# Patient Record
Sex: MALE | Race: BLACK OR AFRICAN AMERICAN | Employment: FULL TIME | URBAN - METROPOLITAN AREA
[De-identification: names, ages, dates, MRNs, and addresses within clinical notes are randomized per-mention and may not be internally consistent; named-entity substitution may affect disease eponyms.]

---

## 2021-05-14 ENCOUNTER — APPOINTMENT (OUTPATIENT)
Dept: GENERAL RADIOLOGY | Age: 31
DRG: 885 | End: 2021-05-14
Attending: PHYSICIAN ASSISTANT
Payer: COMMERCIAL

## 2021-05-14 ENCOUNTER — APPOINTMENT (OUTPATIENT)
Dept: CT IMAGING | Age: 31
DRG: 885 | End: 2021-05-14
Attending: PHYSICIAN ASSISTANT
Payer: COMMERCIAL

## 2021-05-14 ENCOUNTER — HOSPITAL ENCOUNTER (INPATIENT)
Age: 31
LOS: 6 days | Discharge: HOME | DRG: 885 | End: 2021-05-21
Attending: EMERGENCY MEDICINE | Admitting: PSYCHIATRY & NEUROLOGY
Payer: COMMERCIAL

## 2021-05-14 DIAGNOSIS — E87.6 HYPOKALEMIA: ICD-10-CM

## 2021-05-14 DIAGNOSIS — R51.9 NONINTRACTABLE EPISODIC HEADACHE, UNSPECIFIED HEADACHE TYPE: ICD-10-CM

## 2021-05-14 DIAGNOSIS — R79.89 ELEVATED LFTS: ICD-10-CM

## 2021-05-14 DIAGNOSIS — H60.322 ACUTE HEMORRHAGIC OTITIS EXTERNA OF LEFT EAR: ICD-10-CM

## 2021-05-14 DIAGNOSIS — F29 PSYCHOSIS, UNSPECIFIED PSYCHOSIS TYPE (HCC): Primary | ICD-10-CM

## 2021-05-14 DIAGNOSIS — F24: ICD-10-CM

## 2021-05-14 LAB
AMMONIA PLAS-SCNC: 23 UMOL/L (ref 11–32)
AMPHET UR QL SCN: NEGATIVE
APPEARANCE UR: CLEAR
BARBITURATES UR QL SCN: NEGATIVE
BENZODIAZ UR QL: NEGATIVE
BILIRUB UR QL: NEGATIVE
CANNABINOIDS UR QL SCN: POSITIVE
COCAINE UR QL SCN: NEGATIVE
COLOR UR: YELLOW
ETHANOL SERPL-MCNC: 5 MG/DL (ref 0–3)
GLUCOSE UR STRIP.AUTO-MCNC: NEGATIVE MG/DL
HDSCOM,HDSCOM: ABNORMAL
HGB UR QL STRIP: NEGATIVE
KETONES UR QL STRIP.AUTO: NEGATIVE MG/DL
LEUKOCYTE ESTERASE UR QL STRIP.AUTO: NEGATIVE
MAGNESIUM SERPL-MCNC: 2.3 MG/DL (ref 1.6–2.6)
METHADONE UR QL: NEGATIVE
NITRITE UR QL STRIP.AUTO: NEGATIVE
OPIATES UR QL: NEGATIVE
PCP UR QL: NEGATIVE
PH UR STRIP: 6.5 [PH] (ref 5–8)
PROT UR STRIP-MCNC: NEGATIVE MG/DL
SALICYLATES SERPL-MCNC: 2.6 MG/DL (ref 2.8–20)
SP GR UR REFRACTOMETRY: 1 (ref 1–1.03)
UROBILINOGEN UR QL STRIP.AUTO: 1 EU/DL (ref 0.2–1)

## 2021-05-14 PROCEDURE — 80143 DRUG ASSAY ACETAMINOPHEN: CPT

## 2021-05-14 PROCEDURE — 85025 COMPLETE CBC W/AUTO DIFF WBC: CPT

## 2021-05-14 PROCEDURE — 70450 CT HEAD/BRAIN W/O DYE: CPT

## 2021-05-14 PROCEDURE — 96372 THER/PROPH/DIAG INJ SC/IM: CPT

## 2021-05-14 PROCEDURE — 99285 EMERGENCY DEPT VISIT HI MDM: CPT

## 2021-05-14 PROCEDURE — 71045 X-RAY EXAM CHEST 1 VIEW: CPT

## 2021-05-14 PROCEDURE — 82077 ASSAY SPEC XCP UR&BREATH IA: CPT

## 2021-05-14 PROCEDURE — 80179 DRUG ASSAY SALICYLATE: CPT

## 2021-05-14 PROCEDURE — 74011000250 HC RX REV CODE- 250: Performed by: EMERGENCY MEDICINE

## 2021-05-14 PROCEDURE — 81003 URINALYSIS AUTO W/O SCOPE: CPT

## 2021-05-14 PROCEDURE — 83735 ASSAY OF MAGNESIUM: CPT

## 2021-05-14 PROCEDURE — 80053 COMPREHEN METABOLIC PANEL: CPT

## 2021-05-14 PROCEDURE — 82140 ASSAY OF AMMONIA: CPT

## 2021-05-14 PROCEDURE — 84443 ASSAY THYROID STIM HORMONE: CPT

## 2021-05-14 PROCEDURE — 80307 DRUG TEST PRSMV CHEM ANLYZR: CPT

## 2021-05-14 PROCEDURE — 93005 ELECTROCARDIOGRAM TRACING: CPT

## 2021-05-14 PROCEDURE — 72125 CT NECK SPINE W/O DYE: CPT

## 2021-05-14 PROCEDURE — 87635 SARS-COV-2 COVID-19 AMP PRB: CPT

## 2021-05-14 PROCEDURE — 74011250636 HC RX REV CODE- 250/636: Performed by: EMERGENCY MEDICINE

## 2021-05-14 PROCEDURE — 82553 CREATINE MB FRACTION: CPT

## 2021-05-14 RX ORDER — MAGNESIUM SULFATE HEPTAHYDRATE 500 MG/ML
2 INJECTION, SOLUTION INTRAMUSCULAR; INTRAVENOUS
Status: DISCONTINUED | OUTPATIENT
Start: 2021-05-14 | End: 2021-05-14 | Stop reason: SDUPTHER

## 2021-05-14 RX ORDER — MAGNESIUM SULFATE HEPTAHYDRATE 40 MG/ML
2 INJECTION, SOLUTION INTRAVENOUS ONCE
Status: COMPLETED | OUTPATIENT
Start: 2021-05-14 | End: 2021-05-15

## 2021-05-14 RX ORDER — POTASSIUM CHLORIDE 20 MEQ/1
40 TABLET, EXTENDED RELEASE ORAL
Status: COMPLETED | OUTPATIENT
Start: 2021-05-14 | End: 2021-05-15

## 2021-05-14 RX ADMIN — WATER 20 MG: 1 INJECTION INTRAMUSCULAR; INTRAVENOUS; SUBCUTANEOUS at 22:17

## 2021-05-15 PROBLEM — F29 PSYCHOSIS (HCC): Status: ACTIVE | Noted: 2021-05-15

## 2021-05-15 LAB
ALBUMIN SERPL-MCNC: 3.8 G/DL (ref 3.4–5)
ALBUMIN/GLOB SERPL: 1.1 {RATIO} (ref 0.8–1.7)
ALP SERPL-CCNC: 61 U/L (ref 45–117)
ALT SERPL-CCNC: 125 U/L (ref 16–61)
ANION GAP SERPL CALC-SCNC: 2 MMOL/L (ref 3–18)
ANION GAP SERPL CALC-SCNC: 4 MMOL/L (ref 3–18)
ANION GAP SERPL CALC-SCNC: 5 MMOL/L (ref 3–18)
APAP SERPL-MCNC: <2 UG/ML (ref 10–30)
AST SERPL-CCNC: 170 U/L (ref 10–38)
ATRIAL RATE: 77 BPM
BASOPHILS # BLD: 0 K/UL (ref 0–0.1)
BASOPHILS NFR BLD: 0 % (ref 0–2)
BILIRUB SERPL-MCNC: 0.8 MG/DL (ref 0.2–1)
BUN SERPL-MCNC: 11 MG/DL (ref 7–18)
BUN SERPL-MCNC: 9 MG/DL (ref 7–18)
BUN SERPL-MCNC: 9 MG/DL (ref 7–18)
BUN/CREAT SERPL: 10 (ref 12–20)
BUN/CREAT SERPL: 10 (ref 12–20)
BUN/CREAT SERPL: 12 (ref 12–20)
CALCIUM SERPL-MCNC: 7.8 MG/DL (ref 8.5–10.1)
CALCIUM SERPL-MCNC: 8.2 MG/DL (ref 8.5–10.1)
CALCIUM SERPL-MCNC: 8.3 MG/DL (ref 8.5–10.1)
CALCULATED P AXIS, ECG09: 19 DEGREES
CALCULATED R AXIS, ECG10: 54 DEGREES
CALCULATED T AXIS, ECG11: 41 DEGREES
CHLORIDE SERPL-SCNC: 105 MMOL/L (ref 100–111)
CHLORIDE SERPL-SCNC: 109 MMOL/L (ref 100–111)
CHLORIDE SERPL-SCNC: 109 MMOL/L (ref 100–111)
CK MB CFR SERPL CALC: 0.4 % (ref 0–4)
CK MB SERPL-MCNC: 22.4 NG/ML (ref 5–25)
CK SERPL-CCNC: 3549 U/L (ref 39–308)
CK SERPL-CCNC: 4528 U/L (ref 39–308)
CK SERPL-CCNC: 4571 U/L (ref 39–308)
CK SERPL-CCNC: 5494 U/L (ref 39–308)
CO2 SERPL-SCNC: 29 MMOL/L (ref 21–32)
CO2 SERPL-SCNC: 30 MMOL/L (ref 21–32)
CO2 SERPL-SCNC: 31 MMOL/L (ref 21–32)
COVID-19 RAPID TEST, COVR: NOT DETECTED
CREAT SERPL-MCNC: 0.86 MG/DL (ref 0.6–1.3)
CREAT SERPL-MCNC: 0.91 MG/DL (ref 0.6–1.3)
CREAT SERPL-MCNC: 0.95 MG/DL (ref 0.6–1.3)
DIAGNOSIS, 93000: NORMAL
DIFFERENTIAL METHOD BLD: ABNORMAL
EOSINOPHIL # BLD: 0 K/UL (ref 0–0.4)
EOSINOPHIL NFR BLD: 0 % (ref 0–5)
ERYTHROCYTE [DISTWIDTH] IN BLOOD BY AUTOMATED COUNT: 13 % (ref 11.6–14.5)
GLOBULIN SER CALC-MCNC: 3.4 G/DL (ref 2–4)
GLUCOSE SERPL-MCNC: 83 MG/DL (ref 74–99)
GLUCOSE SERPL-MCNC: 93 MG/DL (ref 74–99)
GLUCOSE SERPL-MCNC: 96 MG/DL (ref 74–99)
HCT VFR BLD AUTO: 41.1 % (ref 36–48)
HGB BLD-MCNC: 13.8 G/DL (ref 13–16)
LYMPHOCYTES # BLD: 2 K/UL (ref 0.9–3.6)
LYMPHOCYTES NFR BLD: 23 % (ref 21–52)
MCH RBC QN AUTO: 27.5 PG (ref 24–34)
MCHC RBC AUTO-ENTMCNC: 33.6 G/DL (ref 31–37)
MCV RBC AUTO: 82 FL (ref 74–97)
MONOCYTES # BLD: 0.4 K/UL (ref 0.05–1.2)
MONOCYTES NFR BLD: 4 % (ref 3–10)
NEUTS SEG # BLD: 6.4 K/UL (ref 1.8–8)
NEUTS SEG NFR BLD: 73 % (ref 40–73)
P-R INTERVAL, ECG05: 126 MS
PLATELET # BLD AUTO: 188 K/UL (ref 135–420)
PLATELET COMMENTS,PCOM: ABNORMAL
PMV BLD AUTO: 12.2 FL (ref 9.2–11.8)
POTASSIUM SERPL-SCNC: 3.3 MMOL/L (ref 3.5–5.5)
POTASSIUM SERPL-SCNC: 3.7 MMOL/L (ref 3.5–5.5)
POTASSIUM SERPL-SCNC: 3.9 MMOL/L (ref 3.5–5.5)
PROT SERPL-MCNC: 7.2 G/DL (ref 6.4–8.2)
Q-T INTERVAL, ECG07: 392 MS
QRS DURATION, ECG06: 98 MS
QTC CALCULATION (BEZET), ECG08: 443 MS
RBC # BLD AUTO: 5.01 M/UL (ref 4.35–5.65)
RBC MORPH BLD: ABNORMAL
SODIUM SERPL-SCNC: 139 MMOL/L (ref 136–145)
SODIUM SERPL-SCNC: 142 MMOL/L (ref 136–145)
SODIUM SERPL-SCNC: 143 MMOL/L (ref 136–145)
SOURCE, COVRS: NORMAL
TROPONIN I SERPL-MCNC: <0.02 NG/ML (ref 0–0.04)
TSH SERPL DL<=0.05 MIU/L-ACNC: 1.18 UIU/ML (ref 0.36–3.74)
VENTRICULAR RATE, ECG03: 77 BPM
WBC # BLD AUTO: 8.8 K/UL (ref 4.6–13.2)

## 2021-05-15 PROCEDURE — 74011250637 HC RX REV CODE- 250/637: Performed by: PSYCHIATRY & NEUROLOGY

## 2021-05-15 PROCEDURE — 74011250637 HC RX REV CODE- 250/637: Performed by: PHYSICIAN ASSISTANT

## 2021-05-15 PROCEDURE — 82550 ASSAY OF CK (CPK): CPT

## 2021-05-15 PROCEDURE — 74011250636 HC RX REV CODE- 250/636: Performed by: EMERGENCY MEDICINE

## 2021-05-15 PROCEDURE — 74011000250 HC RX REV CODE- 250: Performed by: PHYSICIAN ASSISTANT

## 2021-05-15 PROCEDURE — 74011250636 HC RX REV CODE- 250/636: Performed by: PHYSICIAN ASSISTANT

## 2021-05-15 PROCEDURE — 80048 BASIC METABOLIC PNL TOTAL CA: CPT

## 2021-05-15 PROCEDURE — 65220000003 HC RM SEMIPRIVATE PSYCH

## 2021-05-15 RX ORDER — AMOXICILLIN 250 MG/1
500 CAPSULE ORAL EVERY 8 HOURS
Status: DISCONTINUED | OUTPATIENT
Start: 2021-05-15 | End: 2021-05-16

## 2021-05-15 RX ORDER — HALOPERIDOL 5 MG/ML
5 INJECTION INTRAMUSCULAR
Status: DISCONTINUED | OUTPATIENT
Start: 2021-05-15 | End: 2021-05-21 | Stop reason: HOSPADM

## 2021-05-15 RX ORDER — HALOPERIDOL 5 MG/1
5 TABLET ORAL
Status: DISCONTINUED | OUTPATIENT
Start: 2021-05-15 | End: 2021-05-21 | Stop reason: HOSPADM

## 2021-05-15 RX ORDER — BENZTROPINE MESYLATE 1 MG/1
1 TABLET ORAL
Status: DISCONTINUED | OUTPATIENT
Start: 2021-05-15 | End: 2021-05-21 | Stop reason: HOSPADM

## 2021-05-15 RX ORDER — LORAZEPAM 2 MG/ML
1 INJECTION INTRAMUSCULAR
Status: DISCONTINUED | OUTPATIENT
Start: 2021-05-15 | End: 2021-05-21 | Stop reason: HOSPADM

## 2021-05-15 RX ORDER — LORAZEPAM 1 MG/1
1 TABLET ORAL
Status: DISCONTINUED | OUTPATIENT
Start: 2021-05-15 | End: 2021-05-21 | Stop reason: HOSPADM

## 2021-05-15 RX ORDER — LORAZEPAM 1 MG/1
2 TABLET ORAL ONCE
Status: DISPENSED | OUTPATIENT
Start: 2021-05-15 | End: 2021-05-16

## 2021-05-15 RX ORDER — TRAZODONE HYDROCHLORIDE 50 MG/1
50 TABLET ORAL
Status: DISCONTINUED | OUTPATIENT
Start: 2021-05-15 | End: 2021-05-21 | Stop reason: HOSPADM

## 2021-05-15 RX ORDER — BENZTROPINE MESYLATE 1 MG/ML
1 INJECTION INTRAMUSCULAR; INTRAVENOUS
Status: DISCONTINUED | OUTPATIENT
Start: 2021-05-15 | End: 2021-05-21 | Stop reason: HOSPADM

## 2021-05-15 RX ADMIN — HALOPERIDOL 5 MG: 5 TABLET ORAL at 23:47

## 2021-05-15 RX ADMIN — POTASSIUM CHLORIDE 40 MEQ: 1500 TABLET, EXTENDED RELEASE ORAL at 00:42

## 2021-05-15 RX ADMIN — BENZTROPINE MESYLATE 1 MG: 1 TABLET ORAL at 23:46

## 2021-05-15 RX ADMIN — WATER 20 MG: 1 INJECTION INTRAMUSCULAR; INTRAVENOUS; SUBCUTANEOUS at 01:04

## 2021-05-15 RX ADMIN — LORAZEPAM 1 MG: 1 TABLET ORAL at 23:46

## 2021-05-15 RX ADMIN — MAGNESIUM SULFATE HEPTAHYDRATE 2 G: 2 INJECTION, SOLUTION INTRAVENOUS at 00:42

## 2021-05-15 RX ADMIN — SODIUM CHLORIDE 1000 ML: 900 INJECTION, SOLUTION INTRAVENOUS at 02:31

## 2021-05-15 RX ADMIN — SODIUM CHLORIDE 1000 ML: 900 INJECTION, SOLUTION INTRAVENOUS at 01:17

## 2021-05-15 RX ADMIN — SODIUM CHLORIDE, SODIUM LACTATE, POTASSIUM CHLORIDE, AND CALCIUM CHLORIDE 1000 ML: 600; 310; 30; 20 INJECTION, SOLUTION INTRAVENOUS at 15:00

## 2021-05-15 NOTE — ED NOTES
Patient has repeatedly taken his cardiac monitoring leads, BP cuff and pulse ox off.      Provider has been notified

## 2021-05-15 NOTE — ED NOTES
[address] HANDOFF      Patient was turned over to me at the regular change of shift by Dr. Jay Riley, at 0800. Patient presented to the emergency department acutely psychotic. Requiring chemical sedation. The patient is awaiting crisis evaluation. He had an elevated CK which is downtrending with IV hydration. Plan for this patient is: Patient will be seen by crisis, medically cleared, anticipate he will require placement so will await their screening process for final disposition. Recent Results (from the past 12 hour(s))   CBC WITH AUTOMATED DIFF    Collection Time: 05/14/21 11:12 PM   Result Value Ref Range    WBC 8.8 4.6 - 13.2 K/uL    RBC 5.01 4.35 - 5.65 M/uL    HGB 13.8 13.0 - 16.0 g/dL    HCT 41.1 36.0 - 48.0 %    MCV 82.0 74.0 - 97.0 FL    MCH 27.5 24.0 - 34.0 PG    MCHC 33.6 31.0 - 37.0 g/dL    RDW 13.0 11.6 - 14.5 %    PLATELET 227 850 - 839 K/uL    MPV 12.2 (H) 9.2 - 11.8 FL    NEUTROPHILS 73 40 - 73 %    LYMPHOCYTES 23 21 - 52 %    MONOCYTES 4 3 - 10 %    EOSINOPHILS 0 0 - 5 %    BASOPHILS 0 0 - 2 %    ABS. NEUTROPHILS 6.4 1.8 - 8.0 K/UL    ABS. LYMPHOCYTES 2.0 0.9 - 3.6 K/UL    ABS. MONOCYTES 0.4 0.05 - 1.2 K/UL    ABS. EOSINOPHILS 0.0 0.0 - 0.4 K/UL    ABS.  BASOPHILS 0.0 0.0 - 0.1 K/UL    DF MANUAL      PLATELET COMMENTS ADEQUATE PLATELETS      RBC COMMENTS NORMOCYTIC, NORMOCHROMIC     CARDIAC PANEL,(CK, CKMB & TROPONIN)    Collection Time: 05/14/21 11:12 PM   Result Value Ref Range    CK - MB 22.4 (H) <3.6 ng/ml    CK-MB Index 0.4 0.0 - 4.0 %    CK 5,494 (H) 39 - 308 U/L    Troponin-I, QT <0.02 0.0 - 0.045 NG/ML   TSH 3RD GENERATION    Collection Time: 05/14/21 11:12 PM   Result Value Ref Range    TSH 1.18 0.36 - 3.74 uIU/mL   AMMONIA    Collection Time: 05/14/21 11:12 PM   Result Value Ref Range    Ammonia 23 11 - 32 UMOL/L   SALICYLATE    Collection Time: 05/14/21 11:12 PM   Result Value Ref Range    Salicylate level 2.6 (L) 2.8 - 33.5 MG/DL   METABOLIC PANEL, COMPREHENSIVE Collection Time: 05/14/21 11:12 PM   Result Value Ref Range    Sodium 139 136 - 145 mmol/L    Potassium 3.3 (L) 3.5 - 5.5 mmol/L    Chloride 105 100 - 111 mmol/L    CO2 29 21 - 32 mmol/L    Anion gap 5 3.0 - 18 mmol/L    Glucose 96 74 - 99 mg/dL    BUN 11 7.0 - 18 MG/DL    Creatinine 0.95 0.6 - 1.3 MG/DL    BUN/Creatinine ratio 12 12 - 20      GFR est AA >60 >60 ml/min/1.73m2    GFR est non-AA >60 >60 ml/min/1.73m2    Calcium 8.3 (L) 8.5 - 10.1 MG/DL    Bilirubin, total 0.8 0.2 - 1.0 MG/DL    ALT (SGPT) 125 (H) 16 - 61 U/L    AST (SGOT) 170 (H) 10 - 38 U/L    Alk.  phosphatase 61 45 - 117 U/L    Protein, total 7.2 6.4 - 8.2 g/dL    Albumin 3.8 3.4 - 5.0 g/dL    Globulin 3.4 2.0 - 4.0 g/dL    A-G Ratio 1.1 0.8 - 1.7     ACETAMINOPHEN    Collection Time: 05/14/21 11:12 PM   Result Value Ref Range    Acetaminophen level <2 (L) 10.0 - 30.0 ug/mL   ETHYL ALCOHOL    Collection Time: 05/14/21 11:12 PM   Result Value Ref Range    ALCOHOL(ETHYL),SERUM 5 (H) 0 - 3 MG/DL   MAGNESIUM    Collection Time: 05/14/21 11:12 PM   Result Value Ref Range    Magnesium 2.3 1.6 - 2.6 mg/dL   DRUG SCREEN, URINE    Collection Time: 05/14/21 11:20 PM   Result Value Ref Range    BENZODIAZEPINES Negative NEG      BARBITURATES Negative NEG      THC (TH-CANNABINOL) Positive (A) NEG      OPIATES Negative NEG      PCP(PHENCYCLIDINE) Negative NEG      COCAINE Negative NEG      AMPHETAMINES Negative NEG      METHADONE Negative NEG      HDSCOM (NOTE)    URINALYSIS W/ RFLX MICROSCOPIC    Collection Time: 05/14/21 11:20 PM   Result Value Ref Range    Color YELLOW      Appearance CLEAR      Specific gravity 1.005 1.005 - 1.030      pH (UA) 6.5 5.0 - 8.0      Protein Negative NEG mg/dL    Glucose Negative NEG mg/dL    Ketone Negative NEG mg/dL    Bilirubin Negative NEG      Blood Negative NEG      Urobilinogen 1.0 0.2 - 1.0 EU/dL    Nitrites Negative NEG      Leukocyte Esterase Negative NEG     EKG, 12 LEAD, INITIAL    Collection Time: 05/14/21 11:21 PM Result Value Ref Range    Ventricular Rate 77 BPM    Atrial Rate 77 BPM    P-R Interval 126 ms    QRS Duration 98 ms    Q-T Interval 392 ms    QTC Calculation (Bezet) 443 ms    Calculated P Axis 19 degrees    Calculated R Axis 54 degrees    Calculated T Axis 41 degrees    Diagnosis       Normal sinus rhythm  Normal ECG  No previous ECGs available     COVID-19 RAPID TEST    Collection Time: 05/14/21 11:24 PM   Result Value Ref Range    Specimen source Nasopharyngeal      COVID-19 rapid test Not detected NOTD     CK    Collection Time: 05/15/21  3:41 AM   Result Value Ref Range    CK 4,571 (H) 39 - 719 U/L   METABOLIC PANEL, BASIC    Collection Time: 05/15/21  3:41 AM   Result Value Ref Range    Sodium 142 136 - 145 mmol/L    Potassium 3.7 3.5 - 5.5 mmol/L    Chloride 109 100 - 111 mmol/L    CO2 31 21 - 32 mmol/L    Anion gap 2 (L) 3.0 - 18 mmol/L    Glucose 93 74 - 99 mg/dL    BUN 9 7.0 - 18 MG/DL    Creatinine 0.91 0.6 - 1.3 MG/DL    BUN/Creatinine ratio 10 (L) 12 - 20      GFR est AA >60 >60 ml/min/1.73m2    GFR est non-AA >60 >60 ml/min/1.73m2    Calcium 7.8 (L) 8.5 - 10.1 MG/DL       CT HEAD WO CONT   Final Result      1. No acute intracranial pathology. CT SPINE CERV WO CONT   Final Result      1. No acute fracture or subluxation. Thank you for this referral.      XR CHEST PORT   Final Result   No definite acute findings.           Medications   sodium chloride 0.9 % bolus infusion 1,000 mL (has no administration in time range)   ziprasidone (GEODON) 20 mg in sterile water (preservative free) 1 mL injection (20 mg IntraMUSCular Given 5/14/21 2217)   magnesium sulfate 2 g/50 ml IVPB (premix or compounded) (0 g IntraVENous IV Completed 5/15/21 0231)   potassium chloride (K-DUR, KLOR-CON) SR tablet 40 mEq (40 mEq Oral Given 5/15/21 0042)   sodium chloride 0.9 % bolus infusion 1,000 mL (0 mL IntraVENous IV Completed 5/15/21 0231)   sodium chloride 0.9 % bolus infusion 1,000 mL (0 mL IntraVENous IV Completed 5/15/21 1974)   ziprasidone (GEODON) 20 mg in sterile water (preservative free) 1 mL injection (20 mg IntraMUSCular Given 5/15/21 0104)         Course:   ED Course as of May 15 1741   Sat May 15, 2021   0908 Crisis in to see the patient. [ROLAND]   2226 Patient seen by the crisis screener. Patient is no longer voluntary, will need to be seen by the [facility] for a TDO. Patient insistent that his wife stay with him however that would not be possible on the psych floor. [ROLAND]   1247 Crisis screen requested repeat CK. We will repeat it now and if it is still significantly elevated will order some more fluids before rechecking it. [ROLAND]      ED Course User Index  [ROLAND] Noah Chery DO       Diagnosis:   1. Psychosis, unspecified psychosis type ([facility] [address]    2. Hypokalemia    3. Elevated LFTs    4. Nonintractable episodic headache, unspecified headache type      Will repeat the creatine kinase and give additional IV fluids. Care will be turned over to Dr. Latasha Chavez at the usual change of shift pending repeat CK and placement.     Disposition: TBD    Follow-up Information    None         Patient's Medications    No medications on file

## 2021-05-15 NOTE — ED NOTES
Bedside and verbal shift report given by Jaxson Atkins (off going nurse) to Lacy Phillip RN (oncoming nurse). Report included the following information SBAR and ED Summary.

## 2021-05-15 NOTE — ED NOTES
Patient pacing around in [place], requesting information about mental illness, called upstairs and they will bring down some information

## 2021-05-15 NOTE — ED TRIAGE NOTES
[facility] of [facility] pain and mental health evaluation. During transport to ED, patient \"looked EMS in the eyes and said he saw the devil in them. \" He also tried to jump out of the ambulance during transport. The medics had to pull over and called the police. Patient was somewhat combative with police and ended up hitting his head on the pavement. Upon arrival to the ED patient was combative, agitated and non coroperative.

## 2021-05-15 NOTE — BSMART NOTE
Crisis Note: Patient has requested to leave with MD on call notified and patient to be seen by [facility] for evaluation, Spoke with Vinayak Mack, with paper work faxed and pending evaluation, she will advise writer when completed,

## 2021-05-15 NOTE — BSMART NOTE
Comprehensive Assessment Form Part 1 Section I - Disposition The plan is pending admission to behavioral health unit, CK level is elevated with on call MD requesting continued monitoring with CK level observed trending downward to at least 3000, prior to admit to behavioral health unit. Dr. Damon Briseno has been advised of on call MD request prior to patient arriving on unit, Patient stated he is receptive to [facility] behavioral health unit, discussed with Dr. Damon Briseno. The on-call Psychiatrist consulted was Dr. Herlinda Bowens, The admitting Diagnosis is Psychosis, Section II - Integrated Summary Summary: Writer was requested to provide crisis evaluation for patient, patient stated he was admitted to hospital for \"I had some mixed messages, would ask for help then not ask for help\", he stated \"I'm healing now\". Patient stated he was initially treated at the [facility] because he \"needed to get this stuff right, the messages I am getting are not what I'm supposed to do\",  He stated after 1 day his wife signed him out AMA, he denies SI/HI, he denies visual hallucinations and appears to be responding to internal stimuli, he stated he was \"not sure if I'm hearing things, this is kind of complicated, because I do then\". wife then gestured to patient and he stated \"oh no I only hear you\". He stated he left the [place] because he felt as though he was not receiving the correct message \"they're trying to read me, they were sending odd vibes, the messages they were sending me were not right\". The patient is deemed competent to provide informed consent. The information is given by the patient. The Chief Complaint is Psychosis. The Precipitant Factors are hallucinations and delusions. [facility] The patient has not previously been in restraints. Current therapist: CASSANDRA, Shaun Nix @ [facility].  
 
        Lethality Assessment: The potential for suicide is noted by the following: not noted. The potential for homicide is not noted. The patient has not been a perpetrator of sexual or physical abuse. There are not pending charges. Section III - Psychosocial 
The patient's overall mood and attitude is elevated and anxious. Feelings of helplessness and hopelessness are not observed. Generalized anxiety is observed by rapid speech, pacing, inability to sit while talking and wringing hands and rubbing. Panic is not observed. Phobias are not observed. Obsessive compulsive tendencies are not observed. Section IV - Mental Status Exam 
The patient's appearance shows poor hygiene hair is unkempt. The patient's behavior is manic , shows poor impulse control, is restless and elevated mood,  The patient is oriented to time, place, person and situation. The patient's speech is rapid and pressured. The patient's mood  is anxious and is expansive. The range of affect is labile and is inappropriate. The patient's thought content  demonstrates delusions, ideas of reference, paranoia. The thought process shows a flight of ideas. The patient's perception shows no evidence of impairment. The patient's memory is impaired he was unable to recall past events or dates of occurrence. The patient's appetite shows no evidence of impairment. The patient's sleep has evidence of insomnia, with patient reporting it may be related to his new work schedule with little to no sleep at night. The patient shows fair insight. The patient's judgement is fair. Section V - Substance Abuse The patient is using substances. The patient is using cannabis by inhalation for 1-5 years with last use on unknown \"Ah, I don't know\". Section VI - Living Arrangements The patient is . The patient lives with a spouse. The patient has no children in home. The patient does plan to return home upon discharge. The patient does not have legal issues pending. The patient's source of income comes from employment. 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 The patient's greatest support comes from his wife and this person will be involved with the treatment. The patient has not been in an event described as horrible or outside the realm of ordinary life experience either currently or in the past. 
The patient has not been a victim of sexual/physical abuse. Section VII - Other Areas of Clinical Concern The highest grade achieved is first year of college with multiple certifications,  with the overall quality of school experience being described as good. The patient is currently  employed and speaks [place] as a primary language. The patient has no communication impairments affecting communication. The patient's preference for learning can be described as: can read and write adequately. The patient's hearing is normal.  The patient's vision is normal . Anisha Alvraado, PARVIZ, MSN

## 2021-05-15 NOTE — ED NOTES
Crisis here to see patient, patient talking with her, patient requesting to go home, crisis will talk to MD but feels that patient might need to become a TDO

## 2021-05-15 NOTE — ED NOTES
Assume care of patient, patient alert and oriented x 4, patient reading bible and family at bedside, patient waiting for placement upstairs, patient is not suicidal or homicidal,

## 2021-05-15 NOTE — ED NOTES
Purposeful rounding completed:     Side rails up x 1:  YES  Bed in low position and wheels locked: YES  Call bell within reach: YES  Comfort addressed: YES    Toileting needs addressed: YES  Plan of care reviewed/updated with patient and or family members: YES  IV site assessed: YES  Pain assessed and addressed: YES, 0

## 2021-05-15 NOTE — PROGRESS NOTES
conducted an initial consultation and Spiritual Assessment for Shane Jacinto, who is a 33 y.o.,male. Patient's Primary Language is: [place]. According to the patient's EMR Anglican Affiliation is: Non Episcopal.       The  provided the following Interventions:  Initiated a relationship of care and support. Provided Bible as requested. Explored spiritual needs needs while hospitalized. Listened empathically as Chaim Valle and his wife shared thoughts and feelings about God and God's plan for them. Encouraged Chaim Molinar to request a  again if desired. Prayed with them and offered assurance of continued prayers. .   Chart reviewed. The following outcomes where achieved:  Patient shared limited information about both their medical narrative and spiritual journey/beliefs. Patient processed feelings about current hospitalization and his carley. Patient expressed gratitude for 's visit. Assessment:  Patient did not express any Episcopal/cultural needs that will affect patient's preferences in health care. There are no spiritual or Episcopal issues which require intervention at this time. Plan:  Chaplains will continue to follow and will provide pastoral care on an as needed/requested basis.  recommends bedside caregivers page  on duty if patient shows signs of acute spiritual or emotional distress.     [address]   [phone]

## 2021-05-15 NOTE — ED PROVIDER NOTES
EMERGENCY DEPARTMENT HISTORY AND PHYSICAL EXAM    11:13 PM      Date: 5/14/2021  Patient Name: Frederic West    History of Presenting Illness     Chief Complaint   Patient presents with    Chest Pain    Mental Health Problem         History Provided By: Patient, EMS, Wife    Additional History (Context): Frederic West is a 33 y.o. male with no known PMH who presents to the ED due to central chest pain and headache x unknown time. Patient notes he was at [facility] and then called an ambulance. Pt will not provide further history    Per EMS, patient became agitated, spoke of spirits, and tried to jump out of the medic. Pt denies SI, HI, drug or alcohol use. Per wife, patient was recently admitted to the [place] [facility] and \"I signed him out 2 days ago \". Notes Gilma Skinner thinks they gave him drugs that are making him this way \". Denies daily medication, hx of psychiatric illness. PCP: None    Current Facility-Administered Medications   Medication Dose Route Frequency Provider Last Rate Last Admin    sodium chloride 0.9 % bolus infusion 1,000 mL  1,000 mL IntraVENous Deitra Sammy Peng, [address] 1,000 mL/hr at 05/15/21 0117 1,000 mL at 05/15/21 0117    sodium chloride 0.9 % bolus infusion 1,000 mL  1,000 mL IntraVENous Deitra Sammy Peng, [address]        sodium chloride 0.9 % bolus infusion 1,000 mL  1,000 mL IntraVENous Veena Causey, [address]           Past History     Past Medical History:  No past medical history on file. Past Surgical History:  No past surgical history on file. Family History:  No family history on file. Social History:  Social History     Tobacco Use    Smoking status: Not on file   Substance Use Topics    Alcohol use: Not on file    Drug use: Not on file       Allergies:  No Known Allergies      Review of Systems       Review of Systems   Constitutional: Negative for chills and fever. Respiratory: Negative for shortness of breath.     Cardiovascular: Positive for chest pain. Gastrointestinal: Negative for abdominal pain, nausea and vomiting. Skin: Negative for rash. Neurological: Positive for headaches. Negative for weakness. Psychiatric/Behavioral: Positive for agitation, behavioral problems and hallucinations. The patient is nervous/anxious and is hyperactive. All other systems reviewed and are negative. Physical Exam     Visit Vitals  /66   Pulse 83   Temp 97.3 °F (36.3 °C)   Resp 16   Wt 99.8 kg (220 lb)   SpO2 94%         Physical Exam  Vitals signs and nursing note reviewed. Constitutional:       General: He is not in acute distress. Appearance: He is well-developed. He is not ill-appearing, toxic-appearing or diaphoretic. HENT:      Head: Normocephalic and atraumatic. Neck:      Musculoskeletal: Normal range of motion and neck supple. Cardiovascular:      Rate and Rhythm: Normal rate and regular rhythm. Heart sounds: Normal heart sounds. No murmur. No friction rub. No gallop. Pulmonary:      Effort: Pulmonary effort is normal. No respiratory distress. Breath sounds: Normal breath sounds. No wheezing or rales. Musculoskeletal: Normal range of motion. Skin:     General: Skin is warm. Findings: No rash. Neurological:      Mental Status: He is alert and oriented to person, place, and time. Cranial Nerves: Cranial nerves are intact. Gait: Gait is intact. Psychiatric:         Attention and Perception: He is inattentive. He perceives visual hallucinations. Mood and Affect: Affect is labile and inappropriate. Speech: Speech is rapid and pressured. Behavior: Behavior is agitated, aggressive, hyperactive and combative. Thought Content: Thought content does not include homicidal or suicidal ideation.       Comments: Pt running around ED, took off his shirt, notes there are spirits passing through him           Diagnostic Study Results     Labs -  Recent Results (from the past 12 hour(s))   CBC WITH AUTOMATED DIFF    Collection Time: 05/14/21 11:12 PM   Result Value Ref Range    WBC 8.8 4.6 - 13.2 K/uL    RBC 5.01 4.35 - 5.65 M/uL    HGB 13.8 13.0 - 16.0 g/dL    HCT 41.1 36.0 - 48.0 %    MCV 82.0 74.0 - 97.0 FL    MCH 27.5 24.0 - 34.0 PG    MCHC 33.6 31.0 - 37.0 g/dL    RDW 13.0 11.6 - 14.5 %    PLATELET 818 429 - 963 K/uL    MPV 12.2 (H) 9.2 - 11.8 FL    NEUTROPHILS 73 40 - 73 %    LYMPHOCYTES 23 21 - 52 %    MONOCYTES 4 3 - 10 %    EOSINOPHILS 0 0 - 5 %    BASOPHILS 0 0 - 2 %    ABS. NEUTROPHILS 6.4 1.8 - 8.0 K/UL    ABS. LYMPHOCYTES 2.0 0.9 - 3.6 K/UL    ABS. MONOCYTES 0.4 0.05 - 1.2 K/UL    ABS. EOSINOPHILS 0.0 0.0 - 0.4 K/UL    ABS.  BASOPHILS 0.0 0.0 - 0.1 K/UL    DF MANUAL      PLATELET COMMENTS ADEQUATE PLATELETS      RBC COMMENTS NORMOCYTIC, NORMOCHROMIC     CARDIAC PANEL,(CK, CKMB & TROPONIN)    Collection Time: 05/14/21 11:12 PM   Result Value Ref Range    CK - MB 22.4 (H) <3.6 ng/ml    CK-MB Index 0.4 0.0 - 4.0 %    CK 5,494 (H) 39 - 308 U/L    Troponin-I, QT <0.02 0.0 - 0.045 NG/ML   TSH 3RD GENERATION    Collection Time: 05/14/21 11:12 PM   Result Value Ref Range    TSH 1.18 0.36 - 3.74 uIU/mL   AMMONIA    Collection Time: 05/14/21 11:12 PM   Result Value Ref Range    Ammonia 23 11 - 32 UMOL/L   SALICYLATE    Collection Time: 05/14/21 11:12 PM   Result Value Ref Range    Salicylate level 2.6 (L) 2.8 - 95.9 MG/DL   METABOLIC PANEL, COMPREHENSIVE    Collection Time: 05/14/21 11:12 PM   Result Value Ref Range    Sodium 139 136 - 145 mmol/L    Potassium 3.3 (L) 3.5 - 5.5 mmol/L    Chloride 105 100 - 111 mmol/L    CO2 29 21 - 32 mmol/L    Anion gap 5 3.0 - 18 mmol/L    Glucose 96 74 - 99 mg/dL    BUN 11 7.0 - 18 MG/DL    Creatinine 0.95 0.6 - 1.3 MG/DL    BUN/Creatinine ratio 12 12 - 20      GFR est AA >60 >60 ml/min/1.73m2    GFR est non-AA >60 >60 ml/min/1.73m2    Calcium 8.3 (L) 8.5 - 10.1 MG/DL    Bilirubin, total 0.8 0.2 - 1.0 MG/DL    ALT (SGPT) 125 (H) 16 - 61 U/L    AST (SGOT) 170 (H) 10 - 38 U/L    Alk.  phosphatase 61 45 - 117 U/L    Protein, total 7.2 6.4 - 8.2 g/dL    Albumin 3.8 3.4 - 5.0 g/dL    Globulin 3.4 2.0 - 4.0 g/dL    A-G Ratio 1.1 0.8 - 1.7     ACETAMINOPHEN    Collection Time: 05/14/21 11:12 PM   Result Value Ref Range    Acetaminophen level <2 (L) 10.0 - 30.0 ug/mL   ETHYL ALCOHOL    Collection Time: 05/14/21 11:12 PM   Result Value Ref Range    ALCOHOL(ETHYL),SERUM 5 (H) 0 - 3 MG/DL   MAGNESIUM    Collection Time: 05/14/21 11:12 PM   Result Value Ref Range    Magnesium 2.3 1.6 - 2.6 mg/dL   DRUG SCREEN, URINE    Collection Time: 05/14/21 11:20 PM   Result Value Ref Range    BENZODIAZEPINES Negative NEG      BARBITURATES Negative NEG      THC (TH-CANNABINOL) Positive (A) NEG      OPIATES Negative NEG      PCP(PHENCYCLIDINE) Negative NEG      COCAINE Negative NEG      AMPHETAMINES Negative NEG      METHADONE Negative NEG      HDSCOM (NOTE)    URINALYSIS W/ RFLX MICROSCOPIC    Collection Time: 05/14/21 11:20 PM   Result Value Ref Range    Color YELLOW      Appearance CLEAR      Specific gravity 1.005 1.005 - 1.030      pH (UA) 6.5 5.0 - 8.0      Protein Negative NEG mg/dL    Glucose Negative NEG mg/dL    Ketone Negative NEG mg/dL    Bilirubin Negative NEG      Blood Negative NEG      Urobilinogen 1.0 0.2 - 1.0 EU/dL    Nitrites Negative NEG      Leukocyte Esterase Negative NEG     EKG, 12 LEAD, INITIAL    Collection Time: 05/14/21 11:21 PM   Result Value Ref Range    Ventricular Rate 77 BPM    Atrial Rate 77 BPM    P-R Interval 126 ms    QRS Duration 98 ms    Q-T Interval 392 ms    QTC Calculation (Bezet) 443 ms    Calculated P Axis 19 degrees    Calculated R Axis 54 degrees    Calculated T Axis 41 degrees    Diagnosis       Normal sinus rhythm  Normal ECG  No previous ECGs available     COVID-19 RAPID TEST    Collection Time: 05/14/21 11:24 PM   Result Value Ref Range    Specimen source Nasopharyngeal      COVID-19 rapid test Not detected NOTD         Radiologic Studies -   CT HEAD WO CONT   Final Result      1. No acute intracranial pathology. CT SPINE CERV WO CONT   Final Result      1. No acute fracture or subluxation. Thank you for this referral.      XR CHEST PORT    (Results Pending)         Medical Decision Making   I am the first provider for this patient. I reviewed the vital signs, available nursing notes, past medical history, past surgical history, family history and social history. Vital Signs-Reviewed the patient's vital signs. EKG: Interpreted by the EP. Time Interpreted: 2325   Rate: 77   Rhythm: normal sinus rhythm     Records Reviewed: Nursing Notes and Old Medical Records (Time of Review: 11:13 PM)    ED Course: Progress Notes, Reevaluation, and Consults:  12:25 AM: Pt CUBA to CT   1:00 PM: Pt resting more comfortably. PROGRESS NOTE:  1:51 AM   Patient care will be transferred to Dr. Ro Murdock. Discussed available diagnostic results and care plan at length. Pending repeat BMP, CK, crisis consultation, possible ECO. Written by Theorin Kyle PA-C      Diagnosis     Clinical Impression:   1. Psychosis, unspecified psychosis type ([address]    2. Hypokalemia    3. Elevated LFTs    4. Nonintractable episodic headache, unspecified headache type        Disposition: TBD     Follow-up Information    None          Patient's Medications    No medications on file       Dictation disclaimer:  Please note that this dictation was completed with [email or web address], the computer voice recognition software. Quite often unanticipated grammatical, syntax, homophones, and other interpretive errors are inadvertently transcribed by the computer software. Please disregard these errors. Please excuse any errors that have escaped final proofreading.

## 2021-05-15 NOTE — ED NOTES
Patient pulled out IV access. Patient has been pacing around the room, talking about feeling like he is \"going backwards, mentally. \" He also states he \"needs to be grounded\"     Patient refusing vitals.  Dr. Nino Vaughan notified

## 2021-05-15 NOTE — ED NOTES
Purposeful rounding completed:    Side rails up x 0:  YES  Bed in low position and wheels locked: YES  Call bell within reach: YES  Comfort addressed: YES    Toileting needs addressed: YES  Plan of care reviewed/updated with patient and or family members: YES  IV site assessed: YES  Pain assessed and addressed: YES, 0

## 2021-05-16 PROCEDURE — 74011250637 HC RX REV CODE- 250/637: Performed by: PSYCHIATRY & NEUROLOGY

## 2021-05-16 PROCEDURE — 74011250637 HC RX REV CODE- 250/637: Performed by: EMERGENCY MEDICINE

## 2021-05-16 PROCEDURE — 65220000003 HC RM SEMIPRIVATE PSYCH

## 2021-05-16 PROCEDURE — 99222 1ST HOSP IP/OBS MODERATE 55: CPT | Performed by: PSYCHIATRY & NEUROLOGY

## 2021-05-16 RX ORDER — AMOXICILLIN 250 MG/1
500 CAPSULE ORAL EVERY 8 HOURS
Status: DISCONTINUED | OUTPATIENT
Start: 2021-05-16 | End: 2021-05-16

## 2021-05-16 RX ORDER — AMOXICILLIN 250 MG/1
500 CAPSULE ORAL EVERY 8 HOURS
Status: DISCONTINUED | OUTPATIENT
Start: 2021-05-16 | End: 2021-05-21 | Stop reason: HOSPADM

## 2021-05-16 RX ADMIN — HALOPERIDOL 5 MG: 5 TABLET ORAL at 18:55

## 2021-05-16 RX ADMIN — AMOXICILLIN 500 MG: 250 CAPSULE ORAL at 08:12

## 2021-05-16 RX ADMIN — BENZTROPINE MESYLATE 1 MG: 1 TABLET ORAL at 18:55

## 2021-05-16 RX ADMIN — LORAZEPAM 1 MG: 1 TABLET ORAL at 13:27

## 2021-05-16 RX ADMIN — AMOXICILLIN 500 MG: 250 CAPSULE ORAL at 23:18

## 2021-05-16 RX ADMIN — LORAZEPAM 1 MG: 1 TABLET ORAL at 18:55

## 2021-05-16 RX ADMIN — HALOPERIDOL 5 MG: 5 TABLET ORAL at 13:27

## 2021-05-16 RX ADMIN — CIPROFLOXACIN HYDROCHLORIDE AND HYDROCORTISONE 3 DROP: 2; 10 SUSPENSION AURICULAR (OTIC) at 18:38

## 2021-05-16 RX ADMIN — CIPROFLOXACIN HYDROCHLORIDE AND HYDROCORTISONE 3 DROP: 2; 10 SUSPENSION AURICULAR (OTIC) at 08:12

## 2021-05-16 RX ADMIN — BENZTROPINE MESYLATE 1 MG: 1 TABLET ORAL at 13:27

## 2021-05-16 RX ADMIN — AMOXICILLIN 500 MG: 250 CAPSULE ORAL at 16:04

## 2021-05-16 NOTE — BH NOTES
Patient admitted is a 31yr old male escorted to the unit via ER staff and security via transport chair. Patient refused to sign admission paper. The patient is being admitted due to patient trying to jump out of the ambulance, paranoid, and the patient signed out AMA from the [facility] 2 days ago. During nursing assessment the patient  is guarded,parnoid, talking about the devil. The supervisor came up and and explained to the patient the importance of taking medications. The patient Patient given Ativan for anxiety, Haldol for psychosis and Cogentin for extrapyramidal effects will continue to follow current POC and interventions per policies/protocols. RNs will initiate, develop, implement, review or revise treatment plan.

## 2021-05-16 NOTE — BH NOTES
Group Therapy Note    Patient: Chase Long    Patient # in Group: [id number]    Group Type: Leisure-Creative Group    Group Time: 30 minutes    Method used: Free discussion    Attendance: Chase Long was      compliant with group and participated fully for 100% of the duration. Interaction Narrative: Chase Long had a Stable mood, was Appropriate during group and Christoper Riedel Garedo's thought content was Goal Directed. Writer Reinforced patient's understanding of how to use recreational therapies to enhance one's own ability to effectively process significant stressors that stem from life and issues related to diagnosis symptoms. Patient was Able to listen to others, Able to reflect/comment on own behavior and Able to experience relief/decrease in symptoms. Will continue to monitor and provide redirection, education, and support as needed.

## 2021-05-16 NOTE — ED NOTES
TRANSFER - OUT REPORT:    Verbal report given to [place], RN(name) on Lynn Speaker  being transferred to Adult unit(unit) for routine progression of care       Report consisted of patients Situation, Background, Assessment and   Recommendations(SBAR). Information from the following report(s) SBAR, MAR and Recent Results was reviewed with the receiving nurse. Lines:   Peripheral IV 64/64/85 Left Basilic (Active)   Site Assessment Clean, dry, & intact 05/15/21 1409   Phlebitis Assessment 0 05/15/21 1409   Infiltration Assessment 0 05/15/21 1409   Dressing Status Clean, dry, & intact 05/15/21 1409   Dressing Type Tape;Transparent 05/15/21 1409   Hub Color/Line Status Pink;Flushed;Patent 05/15/21 1409        Opportunity for questions and clarification was provided.       Patient transported with:   [employer]

## 2021-05-16 NOTE — BH NOTES
Progress Note   Patient approached writer with intent to discharge from the unit, citing that he felt as thought he could heal better at home. Writer called Dr. Etelvina Calix and was directed to have patient screened by [facility] for possible TDO. Writer then called Crisis supervisor to alert her as to the need for the screening by the [facility]. Shortly after this, Gianni Wills was alerted by the Crisis supervisor that the patient had already been prescreened by the [facility] while in the ED and was found not detainable previously. After learning this information, writer contacted Dr. Etelvina Calix again and received direction for ok to discharge patient via [place] at this time. Patient was informed of previous and was also redirected/educated by Crisis supervisor on AMA and the need for medication and for follow up services that will not be available until tomorrow at the earliest. Patient then recanted his want/need to [place] discharge and asked for medications to \"keep him straight. \" Patient received haldol 5mg, cogentin 1mg, and ativan 1mg and took without further incident. Will continue to monitor for effectiveness and provide support as needed.

## 2021-05-16 NOTE — ED NOTES
Patient offered ativan, pt declined then ask for medication. Provided pt with medication he then says, \"I changed my mind I don't want it im done playing games. \"

## 2021-05-16 NOTE — H&P
[address] [facility] HISTORY AND PHYSICAL    Name:  Concepcion Gonzales  MR#:   [id number]  :  1990  ACCOUNT #:  [de-identified]  ADMIT DATE:  2021    Admission date to the emergency room was 2021, admission to the [facility] was 05/15/2021. IDENTIFYING INFORMATION:  The patient is a 75-year-old male, , admitted to this facility voluntarily on the above-mentioned date. BASIS FOR ADMISSION:  The patient was brought to the attention of DR. DELONG'S [facility]"" ER via ambulance after he apparently had been evaluated at one of the patient's first emergency rooms in area whom apparently were the ones who had called the ambulance referring the patient to inpatient, took for evaluation here at the ER. The patient initially did not provide any further history when he was examined by the PA that was during the admission to the ER; however, eventually he did describe having a sensation of increased \"pressure\" in his chest and was described as becoming agitated speaking of spirits and had been described as trying to jump out of the ambulance that had brought him in. The patient initially had denied any suicidal thoughts or homicidal thoughts, and had denied the use of drug or alcohol; however, upon admission to the emergency room, the urine drug screen came back positive for cannabis. It is of interest to note that in addition to the patient himself having some psychotic symptoms, questions were raised about his wife also having psychotic symptoms herself. She did mention when she was interviewed that she had \"signed him out after 2 days of being in the [facility],\" reasons for which the patient had demanded discharge from the [facility] not being cleared.   By review of the electronic medical records, we do not have any access to the [place] records, and so the reasons for his admission there and his progress while hospitalized at the [facility] are not known to us.  Nonetheless, the patient himself was found to be rather psychotic, questions raised about his being capable to sign himself involuntarily. The TDO evaluation with a crisis staff with local community services board was requested with the patient being found then not to be detainable apparently because he was willing to sign it. PSYCHIATRIC HISTORY:  The patient denies any previous psychiatric history. When the undersigned met with him and asked him information as to the reasons for which he had consulted with the patient first and later on being referred to us, he answered Chey Saba is internalizing due to neglect. \"  He did not want to elaborate anything more about it; however, as the interview progressed we did mention that the information that we had obtained through the emergency room was indicative of his reality testing being impaired since he was describing the presence of spirits on his chest.  It is not clear either as to why is that, that he was discharged from the [facility]; however, it appears that he was also very suspicious while he was in that facility. This is the first time in which the patient exhibits any symptoms like he was being described having; however, he did describe chronic use of cannabis. He was not able or willing to describe the cannabis that he has been using as being different to the regular one that he has been using in the past and actually did not want to talk very much about the symptoms that he was having; however, while at the emergency room, the patient was offered lorazepam 2 mg to calm down. Not clear if he did take the prescription or not; however, it is to be mentioned that since admission to the floor, he required a combination of haloperidol and Cogentin, the same as lorazepam since he was very agitated or psychotic and since then there had been evidence of improvement of his psychotic symptoms. This is by the way by a staff's description.     MEDICAL HISTORY: While in the emergency room, the patient required administration of at least 3000 mL of saline solution. His past medical history was apparently described as not being on file, the same as his surgical history. ALLERGIES:  THE PATIENT DESCRIBED NOT HAVING ANY TYPE OF ALLERGIES TO MEDICATIONS AND/OR FOOD. REVIEW OF SYSTEMS:  Psychiatric:  Remarkable for agitation, behavioral problems, hallucinations, and described as hyperactive. Neurologic:  Positive for headaches, negative for weaknesses. Cardiovascular:  Remarkable for the history of chest pain. Rest of the review of systems described as negative. PHYSICAL EXAMINATION:  VITAL SIGNS:  Blood pressure in the emergency room 117/66 with a heart rate of 33. The patient was afebrile with a temperature of 97.3 and respirations 16. Bilateral ear infection. Antibiotic therapy initiated. Otherwise be referred to the physical exam performed since the pt's admission to the behavioral unit. LABORATORY DATA:  Multiple labs were performed at the time of the patient's evaluation in the emergency department including a CBC with differential that showed completely normal test results. The urinalysis was found to be negative. Blood chemistry showed a sodium of 139, potassium 3.3, chloride of 105, blood sugar 96, BUN 11, creatinine 0.95, estimated GFR above 60 mL/minute. Liver function tests showed the presence of elevated ALT to 125 and AST to 170. Alkaline phosphatase normal at 61. CK was noted to be elevated to 5500 units per liter with normal troponin. Ammonia levels 23. Acetaminophen and salicylate levels below range. Alcohol level 5 mg/dL. TSH normal at 1.1. Urine drug screen positive only for cannabis. SARS-CoV-19 showed negative result from a nasopharyngeal study. Repeat of the patient's BMP x2 on 05/15 showed normal electrolytes with normal kidney functioning tests and CK trending downward.   On 05/15 at 4:30 p.m., results were as low as 3549 units per liter. Other tests performed in the emergency room including an electrocardiogram, which showed normal results with a normal sinus rhythm, ventricular rate response of 77 beats per minute,  and QTc 443. CT scan of the head and cervical spine showed no evidence of pathology on either one of them. ALCOHOL AND DRUG HISTORY:  The patient denies the use of alcohol. He denies the use of any other illicit drugs and cannabis. He described his use of cannabis as long lasting. Did not want to be very specific; however, he did mention that his cannabis use had gotten to the point in which he was smoking 2-3 times a day at times. He indicated that he is aware that, that is problematic for him and he was planning on being able to do whatever he has to do to be able to stop it. Otherwise, the patient denies abusing over-the-counter medications or prescription medications. PERSONAL HISTORY AND FAMILY HISTORY:  The patient has been  for 10 years. His parents  when he was apparently a child. Both his mother and father had remarried someone else. The patient described having 2 full siblings, a brother and a sister, and 1 half-sister. He was in [employer] for 5 years. 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 The patient has been  to his wife for 10 years. Apparently, they went to the same grade school and high school; however, did not be intimate until later on. The patient and his wife have no children. Talking about not having children was obviously painful to the patient who began to cry when indicating that they did not have any. He will love to have children, he says. Not clear if his wife is not in agreement. Otherwise, the patient's wife appears to be very supportive of him.   When the patient was taken to the [facility], she drove in with him. It is of interest to note that the patient and his wife's family were going to put a missing person request through the local police. Not clear if the patient since then has been able to call his and her family. It is to be mentioned that the patient's wife is also in the emergency department. It is my understanding that there is evidence that she is also having problems with reality testing and that she may have to be psychiatrically hospitalized. MENTAL STATUS EXAMINATION:  Is that of a male who looks his stated age. Very cooperative throughout. The patient showed no evidence of irritability or mood lability. During the evaluation, he was found to be coherent, showing quality of continuity of associations without evidence of flight of ideas, pressure of speech, ideas of reference or influence, or any hallucinatory process. However, when he was at the emergency room, he was actively psychotic and having delusion that Jonny connolly was seen on his chest and that was the reason for his chest pain;\" however, he has not described that happening here. During the evaluation, the patient was not distractible. He is still somewhat guarded and again not wanting to discuss much more about what I have said above regarding the reasons for which he came to the hospital.  He is showing, otherwise, no major evidence of cognitive impairment. Immediate recall was found to be mildly impaired. On occasions, the patient forgot what I had asked him, but in general, his memory is appropriate. Insight and judgment are currently appropriate, and the patient is willing to participate in his care. CLINICAL IMPRESSION:  AXIS I:  Drug-induced psychotic disorder. Cannabis use disorder, severe. AXIS II:  Noncontributory. AXIS III:  Elevated liver enzymes of undetermined etiology with normal ammonia levels, CK elevation, possibly related to dehydration, improving. Bilateral ear infection, under tx.     TREATMENT PLAN:  1. The patient was admitted to the special treatment unit 1, will be seen daily and will be referred to the groups within context of the program.  2.  For now, we will prescribe only the PRNs that the patient is being provided with; haloperidol and lorazepam, the same as Cogentin; however, scheduled meds with exception of prescriptions for amoxicillin and ciprofloxacin drops, nothing else is being prescribed regularly. These prescriptions are for what appears to be an acute ear infection which is stated above. Treatment will be continued. 3.  The patient will have an assigned inpatient  while he is here. We will be talking with the assigned  tomorrow requesting for him or her to call the patient's family, specifically his parents. ESTIMATED LENGTH OF STAY AND PROGNOSIS:  ELOS is 3-5 days. Prognosis will depend upon treatment response and treatment compliance. Rissa Garcia MD, LFAPA      [id number]  D:  05/16/2021 10:54  T:  05/16/2021 16:41  JOB #:  [id number]  [facility]  Medicare Certification Upon Admission    I certify that this patient's inpatient psychiatric hospital admission is medically necessary for:      [x] Treatment which could reasonably be expected to improve this patient's condition,       [] For diagnostic study;     AND     [x] The inpatient psychiatric services are provided while the individual is under the care of a physician and are included in the individualized plan of care. Estimated length of stay/service is 5 days. Plan for post-hospital care: OP f/u will be recommended.      Electronically signed by [unfilled] on 5/17/2021 at 8:42 AM

## 2021-05-16 NOTE — BSMART NOTE
Crisis Note: Spoke with  [facility], regarding elevated Ck, he does not believe this is an issue, patient is medically cleared. Spoke with on call MD regarding Dr. Liliana Walden confirming patient being medically cleared and the CK elevation not being a medical concern. On call MD approved patient for admission to behavioral health unit. Patient has been seen by Ariana Garnerr) for TDO prescreen and is pending outcome of evaluation. LMOM for pt to contact the office.

## 2021-05-16 NOTE — H&P
History and Physical        Patient: Benita Lee               Sex: male          DOA: 5/14/2021         YOB: 1990      Age:  33 y.o.        LOS:  LOS: 1 day        HPI:     Benita Lee is a 33 y.o. AA male who was admitted after telling others that he was seeing devils in their eyes, trying to jump out of an ambulance during transport and being combative with police. He is also a substance abuser. Principal Problem:    Psychosis ([address] (5/15/2021)        No past medical history on file. No past surgical history on file. No family history on file. Social History     Socioeconomic History    Marital status:      Spouse name: Not on file    Number of children: Not on file    Years of education: Not on file    Highest education level: Not on file       Prior to Admission medications    Not on File       No Known Allergies    Review of Systems  A comprehensive review of systems was negative except for that written in the History of Present Illness. Physical Exam:      Visit Vitals  BP (!) 141/94   Pulse 82   Temp 99 °F (37.2 °C)   Resp 16   Wt 99.8 kg (220 lb)   SpO2 99%       Physical Exam:  Physical Exam:   General:  Alert, cooperative, no distress, appears stated age. Eyes:  Conjunctivae/corneas clear. PERRL, EOMs intact. Fundi benign   Ears:  Normal TMs and external ear canals both ears. Nose: Nares normal. Septum midline. Mucosa normal. No drainage or sinus tenderness. Mouth/Throat: Lips, mucosa, and tongue normal. Teeth and gums normal.   Neck: Supple, symmetrical, trachea midline, no adenopathy, thyroid: no enlargement/tenderness/nodules, no carotid bruit and no JVD. Back:   Symmetric, no curvature. ROM normal. No CVA tenderness. Lungs:   Clear to auscultation bilaterally. Heart:  Regular rate and rhythm, S1, S2 normal, no murmur, click, rub or gallop. Abdomen:   Soft, non-tender. Bowel sounds normal. No masses,  No organomegaly.    Extremities: Extremities normal, atraumatic, no cyanosis or edema. Pulses: 2+ and symmetric all extremities. Skin: Skin color, texture, turgor normal. No rashes or lesions, bruises to left upper, inner arm. Possible needle sticks. Lymph nodes: Cervical, supraclavicular, and axillary nodes normal.   Neurologic: CNII-XII intact. Normal strength, sensation and reflexes throughout. Assessment/Plan     Patient was polite and appropriate. Plan is for patient to participate in all unit activities, take medications as ordered and follow all discharge orders.

## 2021-05-16 NOTE — PROGRESS NOTES
Problem: Psychosis  Goal: *STG: Remains safe in hospital  Description: Patient to remain safe every day while hospitalized. Outcome: Progressing Towards Goal  Goal: *STG: Participates in individual and group therapy  Description: Patient to participate in 2-3 group therapy every day while hospitalized. Outcome: Progressing Towards Goal  Goal: Interventions  Outcome: Progressing Towards Goal     Problem: Altered Thought Process (Adult/Pediatric)  Goal: *STG: Participates in treatment plan  Description: Patient to participate in treatment plan every day while hospitalized. Outcome: Progressing Towards Goal  Goal: *STG: Complies with medication therapy  Description: Patient to take medications as prescribed every day while hospitalized. Outcome: Progressing Towards Goal     Payal Cervantes is a 33 y.o. Male that presented today as responding to internal stimuli, delusions of religiosity, loose associations, but cooperative with staff. Payal Cervantes has been compliant with medications, has been eating all meals offered, and has attended groups offered. RN's will initiate, develop, implement, review, and revise treatment plans as necessary. Will continue to provide education, redirection, and support as needed or verbalized by patient.    Signed By: Leonie Hurtado RN     May 16, 2021

## 2021-05-16 NOTE — BH NOTES
The patient was monitored for safety. Affect was confused, anxious, but able to cope with being on the unit. Pt did socialize well with his peers and the staff. Pt did talk with his Dr and Uri Bender did participate in all groups and activities. Pt did eat at all meals and snacks. Staff will continue to monitor for safety and locations.

## 2021-05-16 NOTE — BSMART NOTE
Crisis Note: patient seen by Madeleine Roper) and was found not to be de-tainable, however he has chosen to be voluntarily admitted to acute behavioral health unit for treatment, he will be on [facility] I room 106 bed 2, Dr. Kaity Lin has been made aware.

## 2021-05-16 NOTE — ED NOTES
The patient began getting more agitated in the ED and felt like something was on his chest or his heart. He asked me to listen to his heart and I provided reassurance that his heart sounds good and that there was nothing on his chest.  Yesterday he was stating that a spirit was sitting on his chest and trying to get to his heart. He is also complaining of some left ear pain. His TM is very erythematous with some bleeding in the tissues. His canal is also very erythematous. I have ordered amoxicillin and Ciprodex drops for him. I did order 2 mg of oral Ativan for him this evening. Shortly after the patient found out that he was getting a bed here at [facility], his wife who has also been exhibiting bizarre behavior in the ED checked in to be evaluated for mental health concerns.

## 2021-05-17 PROBLEM — F22 DELUSIONAL DISORDER (HCC): Status: ACTIVE | Noted: 2021-05-17

## 2021-05-17 LAB
ALBUMIN SERPL-MCNC: 4 G/DL (ref 3.4–5)
ALBUMIN/GLOB SERPL: 1.1 {RATIO} (ref 0.8–1.7)
ALP SERPL-CCNC: 67 U/L (ref 45–117)
ALT SERPL-CCNC: 116 U/L (ref 16–61)
AMMONIA PLAS-SCNC: 25 UMOL/L (ref 11–32)
AST SERPL-CCNC: 62 U/L (ref 10–38)
BILIRUB DIRECT SERPL-MCNC: 0.3 MG/DL (ref 0–0.2)
BILIRUB SERPL-MCNC: 0.8 MG/DL (ref 0.2–1)
GLOBULIN SER CALC-MCNC: 3.6 G/DL (ref 2–4)
PROT SERPL-MCNC: 7.6 G/DL (ref 6.4–8.2)

## 2021-05-17 PROCEDURE — 99231 SBSQ HOSP IP/OBS SF/LOW 25: CPT | Performed by: PSYCHIATRY & NEUROLOGY

## 2021-05-17 PROCEDURE — 74011250637 HC RX REV CODE- 250/637: Performed by: PSYCHIATRY & NEUROLOGY

## 2021-05-17 PROCEDURE — 74011250637 HC RX REV CODE- 250/637: Performed by: EMERGENCY MEDICINE

## 2021-05-17 PROCEDURE — 36415 COLL VENOUS BLD VENIPUNCTURE: CPT

## 2021-05-17 PROCEDURE — 80076 HEPATIC FUNCTION PANEL: CPT

## 2021-05-17 PROCEDURE — 65220000003 HC RM SEMIPRIVATE PSYCH

## 2021-05-17 PROCEDURE — 82140 ASSAY OF AMMONIA: CPT

## 2021-05-17 RX ADMIN — AMOXICILLIN 500 MG: 250 CAPSULE ORAL at 07:59

## 2021-05-17 RX ADMIN — TRAZODONE HYDROCHLORIDE 50 MG: 50 TABLET ORAL at 21:22

## 2021-05-17 RX ADMIN — CIPROFLOXACIN HYDROCHLORIDE AND HYDROCORTISONE 3 DROP: 2; 10 SUSPENSION AURICULAR (OTIC) at 08:33

## 2021-05-17 RX ADMIN — AMOXICILLIN 500 MG: 250 CAPSULE ORAL at 16:17

## 2021-05-17 RX ADMIN — AMOXICILLIN 500 MG: 250 CAPSULE ORAL at 23:41

## 2021-05-17 RX ADMIN — LORAZEPAM 1 MG: 1 TABLET ORAL at 23:41

## 2021-05-17 RX ADMIN — CIPROFLOXACIN HYDROCHLORIDE AND HYDROCORTISONE 3 DROP: 2; 10 SUSPENSION AURICULAR (OTIC) at 18:01

## 2021-05-17 NOTE — BSMART NOTE
[facility] Biopsychosocial Assessment Current Level of Psychosocial Functioning  
 
[x]Independent 
[]Dependent []Minimal Assist 
 
 
Comments:   
 
Psychosocial High Risk Factors (check all that apply) []Unable to obtain meds []Chronic illness/pain   
[x]Substance abuse  
[]Lack of Family Support []Financial stress []Isolation  
[x]Inadequate Community Resources 
[]Suicide attempt(s) []Not taking medications []Victim of crime []Developmental Delay 
[x]Unable to manage personal needs []Age 72 or older  
[]  Homeless []Darell transportation []Readmission within 30 days []Unemployment []Traumatic Event Psychiatric Advanced Directive: none indicated Family to involve in treatment: spouse whose in general hospital here Sexual Orientation:  Heterosexual  
 
Patient Strengths:polite, cooperative, asking for help Patient Barriers: CD use, delusional ??? , limited insight CD education provided: in groups & IT Safety plan: contract for safety with nursing staff CMHC/MH history: denies, none reported Plan of Care: 
medication management, group/individual therapies, family meetings, psycho -education, treatment team meetings to assist with stabilization Initial Discharge Plan:  Go to local psych practice of his choosing if not back to [place] system Clinical Summary: This x31 yr  male was admitted through our EDstated he was initially treated at the [facility] because he \"needed to get this stuff right, the messages I am getting are not what I'm supposed to do\",  He stated after 1 day his wife signed him out AMA, he denies SI/HI, he denies visual hallucinations although appears to be responding to internal stimuli, he stated he was \"not sure if I'm hearing things, this is kind of complicated, because I do then\".  wife of x10 yrs gestured to patient and he stated \"oh no I only hear you\". Pt was in [employer] x5/yrs & stopped being treated at the [place] because he felt as though he was not receiving the correct message \"they're trying to read me, they were sending odd vibes, the messages they were sending me were not right\". Now works private sector. He described his use of cannabis as long lasting about x5 yrs. Did not want to be very specific; however, he did mention that his cannabis use had gotten to the point in which he was smoking 2-3 times a day. Wife was being assessed to be admitted to [facility] as possible Covid pt. CLINICAL IMPRESSION: 
AXIS I:  Drug-induced psychotic disorder. Cannabis use disorder, severe. AXIS II:  Noncontributory. AXIS III:  Elevated liver enzymes of undetermined etiology with normal ammonia levels, CK elevation, possibly related to dehydration, improving. Bilateral ear infection, under tx. Intervention:reviewed with pt how tx team works and writer's role. Also encouraged group attendance & participation to help give him better cognitive focus & enhance his social skills. Dr & tx team updated.

## 2021-05-17 NOTE — BSMART NOTE
SOCIAL WORK GROUP THERAPY PROGRESS NOTE Group Time:  10:15am    
 
Group Topic:  Coping Skills    C D Issues Group Participation:   
 
Pt mostly listened being somewhat involved during group discussion and tried staying attentive. Affect flat, commenting about getting frustrated waiting in ED before admission. To cool down kept repeating, \"I came for help don't leave\". Explored \"my body's\" anger warning signs as well as common triggers. Cited a couple others. Differences between Assertive, Aggressive & Non-Assertive Behaviors

## 2021-05-17 NOTE — BH NOTES
Pt twice tried to intervene this shift while I was giving medication to a patient, telling her not to take it. I believe he felt that he was being protective of her, but he was obviously psychotic -- speaking about energy fields and \"different realms\". On the second occasion he put hands on me to try to disrupt my interaction with the same patient. His hands were removed from my body and was verbally redirected by myself and another nurse. No further incidents occurred for the remainder of the shift.

## 2021-05-17 NOTE — GROUP NOTE
[facility] GROUP DOCUMENTATION INDIVIDUAL Group Therapy Note Date: 5/17/2021 Group Start Time: 0830 Group End Time: 2774 Group Topic: Medication SO [facility] 1 SPECIAL TRT 1 PARVIZ Matta  GROUP DOCUMENTATION GROUP Group Therapy Note Attendees: 11 Attendance: Attended Patient's Goal:  Medication compliance Interventions/techniques: Informed Follows Directions: Followed directions Interactions: Interacted appropriately Mental Status: Anxious Behavior/appearance: Cooperative Goals Achieved: Able to self-disclose Jocelyne Rowell RN

## 2021-05-17 NOTE — BH NOTES
The patient was monitored for safety. Affect was suspicious, with some thought blocking. Pt did express all issues verbally and processed with the staff about how he was feeling. Pt did say that he is more aware of why he is here and what he needs to do now to help himself. Pt did use the phone to talk with his family. Pt did talk with his DR and SW. Pt was able to eat at all meals. Staff will continue to monitor for safety and locations.

## 2021-05-17 NOTE — PROGRESS NOTES
Problem: Psychosis  Goal: *STG: Remains safe in hospital  Description: Patient to remain safe every day while hospitalized. Outcome: Progressing Towards Goal     Problem: Altered Thought Process (Adult/Pediatric)  Goal: *STG: Participates in treatment plan  Description: Patient to participate in treatment plan every day while hospitalized. Outcome: Progressing Towards Goal  Goal: *STG: Complies with medication therapy  Description: Patient to take medications as prescribed every day while hospitalized. Outcome: Progressing Towards Goal    Alin Medina is meal and med compliant. He is free from falls and harm. Pt had questions about length of stay and was reassured. Will continue to provide support as needed.

## 2021-05-17 NOTE — BSMART NOTE
OCCUPATIONAL THERAPY PROGRESS NOTE Group Time:  1075 Attendance: The patient attended full group. Participation: The patient participated fully in the activity. Attention: The patient needed redirection to activity/topic at least once. Estee Zavaleta Interaction: The patient occasionally  interacts with others. Generally participated appropriately, loose at times.

## 2021-05-17 NOTE — PROGRESS NOTES
[address]  Inpatient Progress Note     Date of Service: 05/17/21  Hospital Day: 2     Subjective/Interval History   05/17/21    Treatment Team Notes:  Notes reviewed and/or discussed and report that Sky Arce is a patient recently admitted to the facility, attention invited to the psychiatric admission note which is self-explanatory. Patient interview: Sky Arce was interviewed by this writer today. As described by staff, the patient continues to make some delusional-like statements, however the intensity of the psychotic symptoms that were observed at the time of his admission, the same as yesterday when he tried to intervene when a patient was receiving medications being provided by one of our members of the nursing staff, had not happen again. It is to be mentioned that the undersigned also was requested to see his wife in consultation after she was medically admitted due to being positive for the COVID 19 virus. She reassured the undersigned regarding that the cannabis that she and her  had been using is the same that he had that he has used for many times before, and so it appears that their cannabis was not laced with another drug. The patient's urine drug screen was only positive for cannabis, negative otherwise. However even though there is evidence of improvement, there is a still concern regarding some of the patient's believes. If we are able to determine that they are not culturally based, we will then be able to determine that he is suffering with a psychotic episode. It is of interest that the patient was himself was infected with a virus at the time in which his wife was infective sometime in the middle of April. He is testing negative, however if his wife has been tested positive even with the biofire SARS testing. For now we will continue with the same treatment. We will observe him closely and address if the psychotic symptoms remain.   He will require then antipsychotic prescriptions. Objective     Visit Vitals  /81   Pulse 76   Temp 97.7 °F (36.5 °C)   Resp 16   Wt 99.8 kg (220 lb)   SpO2 99%     Vitals are stable    Recent Results (from the past 24 hour(s))   HEPATIC FUNCTION PANEL    Collection Time: 05/17/21  7:00 AM   Result Value Ref Range    Protein, total 7.6 6.4 - 8.2 g/dL    Albumin 4.0 3.4 - 5.0 g/dL    Globulin 3.6 2.0 - 4.0 g/dL    A-G Ratio 1.1 0.8 - 1.7      Bilirubin, total 0.8 0.2 - 1.0 MG/DL    Bilirubin, direct 0.3 (H) 0.0 - 0.2 MG/DL    Alk. phosphatase 67 45 - 117 U/L    AST (SGOT) 62 (H) 10 - 38 U/L    ALT (SGPT) 116 (H) 16 - 61 U/L   AMMONIA    Collection Time: 05/17/21  7:00 AM   Result Value Ref Range    Ammonia 25 11 - 32 UMOL/L     Above results noted. Not clear as to why the patient's liver function tests are elevated. Not clear if the elevated CK test results are the reason for this happening. We will possibly repeat the tests tomorrow, however I may want to obtain a GI consult then. An acute hepatitis panel will be requested. Mental Status Examination     Appearance/Hygiene 33 y.o.  BLACK/ male  Hygiene: Fair   Behavior/Social Relatedness Appropriate   Musculoskeletal Gait/Station: appropriate  Tone (flaccid, cogwheeling, spastic): not assessed  Psychomotor (hyperkinetic, hypokinetic): calm   Involuntary movements (tics, dyskinesias, akathisa, stereotypies): none   Speech   Rate, rhythm, volume, fluency and articulation are appropriate   Mood   denies depression   Affect    labile but improving   Thought Process Linear and goal directed   Thought Content and Perceptual Disturbances Denies self-injurious behavior (SIB), suicidal ideation (SI), aggressive behavior or homicidal ideation (HI)    Denies auditory and visual hallucinations, however questions raised about his being delusional.   Sensorium and Cognition  Grossly intact   Insight  Limited   Judgment  Limited        Assessment/Plan Psychiatric Diagnoses:   Patient Active Problem List   Diagnosis Code    Psychosis [facility]) F29       Medical Diagnoses: Rule out delusional disorder. Rule out psychosis associated with Covid infection. Elevated liver function tests of undetermined etiology    Psychosocial and contextual factors: Same    Level of impairment/disability: Moderately disabled    1. For now we will continue the above treatment. A GI consult will be requested tomorrow after we do further testing. If delusional thoughts remain, regular treatment with antipsychotics will follow. 2.  Reviewed instructions, risks, benefits and side effects of medications  3.   Disposition/Discharge Date: self-care/home, TBD    Cassidy Garcia MD, [facility]  Psychiatry

## 2021-05-18 LAB
ALBUMIN SERPL-MCNC: 3.8 G/DL (ref 3.4–5)
ALBUMIN/GLOB SERPL: 1.1 {RATIO} (ref 0.8–1.7)
ALP SERPL-CCNC: 66 U/L (ref 45–117)
ALT SERPL-CCNC: 104 U/L (ref 16–61)
AST SERPL-CCNC: 40 U/L (ref 10–38)
BILIRUB DIRECT SERPL-MCNC: 0.2 MG/DL (ref 0–0.2)
BILIRUB SERPL-MCNC: 0.5 MG/DL (ref 0.2–1)
GLOBULIN SER CALC-MCNC: 3.5 G/DL (ref 2–4)
HAV IGM SER QL: NEGATIVE
HBV CORE IGM SER QL: NEGATIVE
HBV SURFACE AG SER QL: <0.1 INDEX
HBV SURFACE AG SER QL: NEGATIVE
HCV AB SER IA-ACNC: 0.11 INDEX
HCV AB SERPL QL IA: NEGATIVE
HCV COMMENT,HCGAC: NORMAL
PROT SERPL-MCNC: 7.3 G/DL (ref 6.4–8.2)
SP1: NORMAL
SP2: NORMAL
SP3: NORMAL

## 2021-05-18 PROCEDURE — 74011250637 HC RX REV CODE- 250/637: Performed by: PSYCHIATRY & NEUROLOGY

## 2021-05-18 PROCEDURE — 99232 SBSQ HOSP IP/OBS MODERATE 35: CPT | Performed by: PSYCHIATRY & NEUROLOGY

## 2021-05-18 PROCEDURE — 83516 IMMUNOASSAY NONANTIBODY: CPT

## 2021-05-18 PROCEDURE — 80076 HEPATIC FUNCTION PANEL: CPT

## 2021-05-18 PROCEDURE — 80074 ACUTE HEPATITIS PANEL: CPT

## 2021-05-18 PROCEDURE — 65220000003 HC RM SEMIPRIVATE PSYCH

## 2021-05-18 PROCEDURE — 86704 HEP B CORE ANTIBODY TOTAL: CPT

## 2021-05-18 PROCEDURE — 36415 COLL VENOUS BLD VENIPUNCTURE: CPT

## 2021-05-18 PROCEDURE — 74011250637 HC RX REV CODE- 250/637: Performed by: EMERGENCY MEDICINE

## 2021-05-18 PROCEDURE — 86038 ANTINUCLEAR ANTIBODIES: CPT

## 2021-05-18 RX ORDER — OLANZAPINE 2.5 MG/1
2.5 TABLET ORAL EVERY EVENING
Status: DISCONTINUED | OUTPATIENT
Start: 2021-05-18 | End: 2021-05-18

## 2021-05-18 RX ORDER — OLANZAPINE 2.5 MG/1
2.5 TABLET ORAL 2 TIMES DAILY
Status: DISCONTINUED | OUTPATIENT
Start: 2021-05-18 | End: 2021-05-21 | Stop reason: HOSPADM

## 2021-05-18 RX ADMIN — CIPROFLOXACIN HYDROCHLORIDE AND HYDROCORTISONE 3 DROP: 2; 10 SUSPENSION AURICULAR (OTIC) at 17:52

## 2021-05-18 RX ADMIN — AMOXICILLIN 500 MG: 250 CAPSULE ORAL at 08:00

## 2021-05-18 RX ADMIN — TRAZODONE HYDROCHLORIDE 50 MG: 50 TABLET ORAL at 20:09

## 2021-05-18 RX ADMIN — HALOPERIDOL 5 MG: 5 TABLET ORAL at 23:59

## 2021-05-18 RX ADMIN — CIPROFLOXACIN HYDROCHLORIDE AND HYDROCORTISONE 3 DROP: 2; 10 SUSPENSION AURICULAR (OTIC) at 09:00

## 2021-05-18 RX ADMIN — AMOXICILLIN 500 MG: 250 CAPSULE ORAL at 23:56

## 2021-05-18 RX ADMIN — OLANZAPINE 2.5 MG: 2.5 TABLET, FILM COATED ORAL at 20:09

## 2021-05-18 RX ADMIN — AMOXICILLIN 500 MG: 250 CAPSULE ORAL at 15:54

## 2021-05-18 NOTE — PROGRESS NOTES
[email or web address]  [phone]      GASTROENTEROLOGY BRIEF NOTE  Patient is off floor for meeting. Thank you for consulting us regarding elevation in liver biochemistries /AST 40. This pattern suggestive of drug-induced liver injury compound with EtOH abuse. Toxicology: serum ethyl 5 (H); positive THC; ASA and acetaminophen negative      Recommendation  RUQ US tomorrow  NPO after midnight for RUQ US. Can resume normal diet following US. Additional labs ordered: SHUKRI, AMA, Smooth Ab, Hep B core Ab, INR for today. Repeat LFTs in the morning. Avoid hepatoxic agents where feasible  Would expect resolution of elevated liver biochemistries with abstinence of the known inciting agent/EtOH abuse. Will follow tomorrow. Meena Swann PA-C  [facility].  [email or web address] [employer]  Phone: [phone]

## 2021-05-18 NOTE — PROGRESS NOTES
Problem: Falls - Risk of  Goal: *Absence of Falls  Description: Patient to be absence of falls every day while hospitalized. Outcome: Progressing Towards Goal  Note: Fall Risk Interventions:            Medication Interventions: Teach patient to arise slowly         History of Falls Interventions: Investigate reason for fall         Problem: Psychosis  Goal: *STG: Remains safe in hospital  Description: Patient to remain safe every day while hospitalized. Outcome: Progressing Towards Goal       Pt has been pleasant and cooperative. Has no c/o of si/hi or avh.   Will continue to support

## 2021-05-18 NOTE — BSMART NOTE
OCCUPATIONAL THERAPY PROGRESS NOTE Group Time:  0236 Attendance: The patient attended full group. Participation: The patient participated fully in the activity. Bernadine Chávez Attention: The patient was able to focus on the activity. Interaction: The patient frequently interacts with others. Mood bright. Participated appropriately. Responses to concrete activity questions on subject.

## 2021-05-18 NOTE — BH NOTES
The writer has observed the patient's behavior throughout this shift ([id number]). During this shift patient displayed and dull and flat affect with some thought-blocking. In addition, patient appeared to have displayed poor eye contact and guarded with a suspicion demeanor. Patient verbally asked the writer about discharging and who he needs to talk with about his personal medical record along with his mental status.

## 2021-05-18 NOTE — BH NOTES
MATTY Note: The above pt has been alert and cooperative this shift. He appeared to be focused on getting help with being stabilized on some medications this shift. He has not experienced any falls this shift. He has been compliant with medications this shift. He has not experienced any falls this shift. He has been able to call family and friends on the phone which appeared to have went well this shift. He has appeared to be focused on learning effective coping skills to help him when he gets discharged.

## 2021-05-18 NOTE — BH NOTES
Patient was given ativan 1 mg po at 2341 for complaints of anxiety and having racing thoughts. Noted to be resting quietly with eyes closed at 2400.  Patient later reported he felt better

## 2021-05-18 NOTE — GROUP NOTE
[facility]OO[facility] GROUP DOCUMENTATION INDIVIDUAL Group Therapy Note Date: 5/18/2021 Group Start Time: 1000 Group End Time: 8364 Group Topic: Nursing [facility] 1 SPECIAL TRTMT 1 PARVIZ Schaeffer GROUP DOCUMENTATION GROUP Group Therapy Note Attendees:4 Attendance: Did not attend Raquel Webb RN

## 2021-05-18 NOTE — PROGRESS NOTES
[address]  Inpatient Progress Note     Date of Service: 05/18/21  Hospital Day: 3     Subjective/Interval History   05/18/21    Treatment Team Notes:  Notes reviewed and/or discussed and report that Black Dewitt is a patient with a history of a delusional disorder requiring prescriptions for haloperidol and lorazepam when becoming increasingly agitated and suspicious. Patient interview: Black Dewitt was interviewed by this writer today. The patient is becoming to be more clear regarding his thought processes. However it has become more obvious that indeed, the patient's delusional thinking is being shared with his wife whom has required to be hospitalized due to to her being infected with the Covid virus. So with further information regarding what appears to be what happened prior admission, and the fact that there is improvement as the patient has taken haloperidol as needed when agitated with or increasingly suspicious, prescription with olanzapine will be prescribed. We are choosing the atypical antipsychotic due to his moderate sedative effects, and fairly good overall tolerance. Side effects and adverse effects associated with the prescriptions with atypical antipsychotics were discussed with the patient with consent obtained. It is also to be mentioned that when we met this morning we discussed the presence of elevated liver enzymes which are improving, with the latter results being found in the appropriate section of this progress note. We had also requested a hepatitis panel however results are still pending. A consultation with a GI group has been placed with Ms. Graciela Davis NP to be contacted. The patient was informed about my request in the GI consult, and is agreeable to proceed with a consultation.       Objective     Visit Vitals  BP (!) 170/83 (BP 1 Location: Right arm, BP Patient Position: Sitting)   Pulse 72   Temp 98.8 °F (37.1 °C)   Resp 18   Wt 99.8 kg (220 lb)   SpO2 99%     Elevated systolic blood pressure noted above with a normal heart rate. This appears to be an location of the findings since the patient's blood pressure has been normal before. And so will observe. Recent Results (from the past 24 hour(s))   HEPATITIS PANEL, ACUTE    Collection Time: 05/18/21  9:15 AM   Result Value Ref Range    Hepatitis A, IgM PENDING     __          Hepatitis B surface Ag <0.10 <1.00 Index    Hep B surface Ag Interp. Negative NEG      __          Hepatitis B core, IgM PENDING     __          Hepatitis C virus Ab PENDING Index    Hep C virus Ab Interp. PENDING     Hep C  virus Ab comment         HEPATIC FUNCTION PANEL    Collection Time: 05/18/21  9:15 AM   Result Value Ref Range    Protein, total 7.3 6.4 - 8.2 g/dL    Albumin 3.8 3.4 - 5.0 g/dL    Globulin 3.5 2.0 - 4.0 g/dL    A-G Ratio 1.1 0.8 - 1.7      Bilirubin, total 0.5 0.2 - 1.0 MG/DL    Bilirubin, direct 0.2 0.0 - 0.2 MG/DL    Alk. phosphatase 66 45 - 117 U/L    AST (SGOT) 40 (H) 10 - 38 U/L    ALT (SGPT) 104 (H) 16 - 61 U/L     Above results noted. Mental Status Examination     Appearance/Hygiene 33 y.o.  BLACK/ male  Hygiene: Fair   Behavior/Social Relatedness Appropriate, with occasional agitation   Musculoskeletal Gait/Station: appropriate  Tone (flaccid, cogwheeling, spastic): not assessed  Psychomotor (hyperkinetic, hypokinetic): calm   Involuntary movements (tics, dyskinesias, akathisa, stereotypies): none   Speech   Rate, rhythm, volume, fluency and articulation are appropriate   Mood   denies being depressed   Affect    lability has improved   Thought Process Linear and goal directed   Thought Content and Perceptual Disturbances Denies self-injurious behavior (SIB), suicidal ideation (SI), aggressive behavior or homicidal ideation (HI)  Auditory hallucinations appear to be less intense, the same as his delusional thought process appears to be improving   Sensorium and Cognition Grossly intact   Insight  improving   Judgment  improving        Assessment/Plan      Psychiatric Diagnoses:   Patient Active Problem List   Diagnosis Code    Psychosis ([facility] [address] F29    Delusional disorder ([facility] [address] F22       Medical Diagnoses: Plus elevated liver function tests improving. We question the elevated liver function test being associated to the patient's rhabdomyolysis, however will be asking for GI consultation as stated. Psychosocial and contextual factors: Same    Level of impairment/disability: Improved to moderately severe    1. We will start treatment with olanzapine twice a day. A GI consult has been ordered. Elevated function tests are improving, with other test being requested. Please see medical orders. 2.  Reviewed instructions, risks, benefits and side effects of medications  3.   Disposition/Discharge Date: self-care/home, TBD    Allison Jimenes MD, [address] [place] [address]

## 2021-05-18 NOTE — BSMART NOTE
ART THERAPY GROUP PROGRESS NOTE PATIENT SCHEDULED FOR GROUP AT: 13:00 
 
ATTENDANCE: Full PARTICIPATION LEVEL: Participates fully in the art process. ATTENTION LEVEL : Able to focus on task. FOCUS: Media Exploration SYMBOLIC & THEMATIC CONTENT AS NOTED IN IMAGERY: Patient was cheerful, talkative, and presented with a bright affect. He appeared to appreciate attention received from nursing students present, which influenced his approach to task and responses in group. Responses had a superfacial and attention seeking quality. He displayed poor boundaries and attempted to grab materials without asking. He was invested at the task at hand and alert.

## 2021-05-19 ENCOUNTER — APPOINTMENT (OUTPATIENT)
Dept: ULTRASOUND IMAGING | Age: 31
DRG: 885 | End: 2021-05-19
Attending: PHYSICIAN ASSISTANT
Payer: COMMERCIAL

## 2021-05-19 LAB
ALBUMIN SERPL-MCNC: 3.7 G/DL (ref 3.4–5)
ALBUMIN/GLOB SERPL: 1 {RATIO} (ref 0.8–1.7)
ALP SERPL-CCNC: 62 U/L (ref 45–117)
ALT SERPL-CCNC: 91 U/L (ref 16–61)
ANA TITR SER IF: NEGATIVE {TITER}
AST SERPL-CCNC: 31 U/L (ref 10–38)
BILIRUB DIRECT SERPL-MCNC: 0.2 MG/DL (ref 0–0.2)
BILIRUB SERPL-MCNC: 0.6 MG/DL (ref 0.2–1)
CHOLEST SERPL-MCNC: 110 MG/DL
CK SERPL-CCNC: 503 U/L (ref 39–308)
GLOBULIN SER CALC-MCNC: 3.6 G/DL (ref 2–4)
HBA1C MFR BLD: 5.6 % (ref 4.2–5.6)
HBV CORE AB SERPL QL IA: NEGATIVE
HDLC SERPL-MCNC: 39 MG/DL (ref 40–60)
HDLC SERPL: 2.8 {RATIO} (ref 0–5)
INR PPP: 1.1 (ref 0.8–1.2)
LDLC SERPL CALC-MCNC: 59.4 MG/DL (ref 0–100)
LIPID PROFILE,FLP: ABNORMAL
MITOCHONDRIA M2 IGG SER-ACNC: <20 UNITS (ref 0–20)
PLATELET # BLD AUTO: 193 K/UL (ref 135–420)
PROT SERPL-MCNC: 7.3 G/DL (ref 6.4–8.2)
PROTHROMBIN TIME: 14 SEC (ref 11.5–15.2)
TRIGL SERPL-MCNC: 58 MG/DL (ref ?–150)
VLDLC SERPL CALC-MCNC: 11.6 MG/DL

## 2021-05-19 PROCEDURE — 82550 ASSAY OF CK (CPK): CPT

## 2021-05-19 PROCEDURE — 80061 LIPID PANEL: CPT

## 2021-05-19 PROCEDURE — 74011250637 HC RX REV CODE- 250/637: Performed by: PSYCHIATRY & NEUROLOGY

## 2021-05-19 PROCEDURE — 36415 COLL VENOUS BLD VENIPUNCTURE: CPT

## 2021-05-19 PROCEDURE — 65220000003 HC RM SEMIPRIVATE PSYCH

## 2021-05-19 PROCEDURE — 85610 PROTHROMBIN TIME: CPT

## 2021-05-19 PROCEDURE — 76705 ECHO EXAM OF ABDOMEN: CPT

## 2021-05-19 PROCEDURE — 99232 SBSQ HOSP IP/OBS MODERATE 35: CPT | Performed by: PSYCHIATRY & NEUROLOGY

## 2021-05-19 PROCEDURE — 83036 HEMOGLOBIN GLYCOSYLATED A1C: CPT

## 2021-05-19 PROCEDURE — 74011250637 HC RX REV CODE- 250/637: Performed by: EMERGENCY MEDICINE

## 2021-05-19 PROCEDURE — 85049 AUTOMATED PLATELET COUNT: CPT

## 2021-05-19 PROCEDURE — 80076 HEPATIC FUNCTION PANEL: CPT

## 2021-05-19 RX ADMIN — AMOXICILLIN 500 MG: 250 CAPSULE ORAL at 16:11

## 2021-05-19 RX ADMIN — OLANZAPINE 2.5 MG: 2.5 TABLET, FILM COATED ORAL at 08:22

## 2021-05-19 RX ADMIN — CIPROFLOXACIN HYDROCHLORIDE AND HYDROCORTISONE 3 DROP: 2; 10 SUSPENSION AURICULAR (OTIC) at 09:00

## 2021-05-19 RX ADMIN — CIPROFLOXACIN HYDROCHLORIDE AND HYDROCORTISONE 3 DROP: 2; 10 SUSPENSION AURICULAR (OTIC) at 18:00

## 2021-05-19 RX ADMIN — TRAZODONE HYDROCHLORIDE 50 MG: 50 TABLET ORAL at 21:34

## 2021-05-19 RX ADMIN — AMOXICILLIN 500 MG: 250 CAPSULE ORAL at 08:22

## 2021-05-19 RX ADMIN — HALOPERIDOL 5 MG: 5 TABLET ORAL at 21:34

## 2021-05-19 RX ADMIN — OLANZAPINE 2.5 MG: 2.5 TABLET, FILM COATED ORAL at 20:06

## 2021-05-19 NOTE — GROUP NOTE
CHAS  GROUP DOCUMENTATION INDIVIDUAL Group Therapy Note Date: 5/18/2021 Group Start Time: 7459 Group End Time: 1400 Group Topic: Social Work Group [facility] 1 ADULT CHEM DEP Tita Claudio, [address] [facility] Group Therapy Note Attendees: 7 Attendance: Attended Interventions/techniques: Informed Follows Directions: Followed directions Interactions: Interacted appropriately Mental Status: Calm Behavior/appearance: Attentive Goals Achieved: Able to listen to others Mary Carmona

## 2021-05-19 NOTE — GROUP NOTE
CHAS  GROUP DOCUMENTATION INDIVIDUAL Group Therapy Note Date: 5/18/2021 Group Start Time: 2000 Group End Time: 2030 Group Topic: Medication [facility] 1 SPECIAL TRTMT 1 Jerrod DOHERTY  GROUP DOCUMENTATION GROUP Group Therapy Note Attendees:7 Attendance: Attended Interventions/techniques: Informed Follows Directions: Followed directions Interactions: Interacted appropriately Mental Status: Calm Behavior/appearance: Attentive Goals Achieved: Able to listen to others Sasha Sánchez

## 2021-05-19 NOTE — GROUP NOTE
CHAS  GROUP DOCUMENTATION INDIVIDUAL Group Therapy Note Date: 5/19/2021 Group Start Time: 5206 Group End Time: 0801 Group Topic: Nursing [facility] 1 SPECIAL TRTMT 1 PARVIZ Baird  GROUP DOCUMENTATION GROUP Group Therapy Note Attendees: 3 Attendance: Did not attend Kari Waters RN

## 2021-05-19 NOTE — PROGRESS NOTES
Problem: Psychosis  Goal: *STG: Remains safe in hospital  Description: Patient to remain safe every day while hospitalized. Outcome: Progressing Towards Goal     Problem: Altered Thought Process (Adult/Pediatric)  Goal: *STG: Participates in treatment plan  Description: Patient to participate in treatment plan every day while hospitalized. Outcome: Progressing Towards Goal  Goal: *STG: Complies with medication therapy  Description: Patient to take medications as prescribed every day while hospitalized. Outcome: Progressing Towards Goal       Cooperative, pleasant. Denies si/hi and avh. Pt reported that he does ruminate sometimes and this has led to anxiety. Pt encouraged to express his thoughts with staff if they are causing anxiety. Pt was receptive.   Will continue to support

## 2021-05-19 NOTE — BH NOTES
At approximately 2230, patient came into day room area and asked staff if he could sleep somewhere besides his room as his room mates made too much noise. Staff gave patient option of a room change that he denied stating he knows the other roommate will eventually wake up as said patient had been asleep most of the day. Patient was allowed by staff to sit and relax in the day room where patient began steering conversation towards his release and that he feels the hospital can do no more for him and he wants to leave. Staff tried asking patient if anything else was going on such as harrassment but patient replies with, \"No y'all just aren't getting it\", and continues to stress he needs to be discharged. Patient did state that people who shouldn't know he was in the hospital have been calling him but when pressed by staff for any sort of further information patient was unclear just stating it was people who shouldn't know he was there. By the end of the conversation the only reasoning patient gave that he should be discharged is, \"I have greater insight now and I'm not hallucinating. \"  Patient remains in the day room area at this time. Will continue to monitor.

## 2021-05-19 NOTE — CONSULTS
[email or web address]  [phone]    GASTROENTEROLOGY CONSULT      Impression:   1. Elevated liver biochemistries - drug induced v EtOH use v self-induced fasting v rhabdomyolysis; neg viral hepatitis   - trending down AST 31; ALT 91 previously AST 40;   2. Alcohol use above sensible limits  3. Elevated CK consistent with rhabdomyolysis   - 503 down from 3549  4. Delusional disorder      Plan:     1. Best supportive care. Would expect for continued improvement on liver biochemistries with resolution of inciting agent(s). Additional panels pending to include AMA, SHUKRI, smooth Ab; Hep B core Ab. Abdominal US pending. 2. Alcohol abstinence advised. 3. Encouraged adequate hydration and nutritional intake  4. Avoid hepatoxic agents. Acetaminophen use is appropriate with doses less than 3000 mg in a 24 hour period. Do not recommend NSAIDs. 5. Patient advised to establish care with PCP following discharge for continued management. Will sign off, but remain available as needed. Thank you for this consultation and the opportunity to participate in the care of this patient. Please do not hesitate to call with any questions or concerns, or should event occur that may necessitate additional GI evaluation. Reason for consult: elevated LFTs      HPI:  Hector Turcios is a 33 y.o. male who I am being asked to see in consultation for an opinion regarding incidental finding of elevated liver biochemistries at  and AST 40. There was also finding of CK at 3549 at initial admission on 5/15/21. Patient seen today, notes alcohol intake, but had been reducing intake prior to hospitalization. No prior history of liver disease. Notes that medications wise was only taking metformin and albuterol. Notes that prior to hospitalization, he was in a fasting period for about 1 month limiting caloric and hydration intake. Was not counting intake. No abdominal pain, nausea, vomiting, fecal or urine abnormalities.  Notes that he is eating better and feels good today. He desires to return to work to care for himself and his wife. He lives in [place]. PMH:   No past medical history on file. PSH:   No past surgical history on file. Social HX:   Social History     Socioeconomic History    Marital status:      Spouse name: Not on file    Number of children: Not on file    Years of education: Not on file    Highest education level: Not on file   Occupational History    Not on file   Tobacco Use    Smoking status: Not on file   Substance and Sexual Activity    Alcohol use: Not on file    Drug use: Not on file    Sexual activity: Not on file   Other Topics Concern    Not on file   Social History Narrative    Not on file     Social Determinants of Health     Financial Resource Strain:     Difficulty of Paying Living Expenses:    Food Insecurity:     Worried About Running Out of Food in the Last Year:     [address] in the Last Year:    Transportation Needs:     Lack of Transportation (Medical):  Lack of Transportation (Non-Medical):    Physical Activity:     Days of Exercise per Week:     Minutes of Exercise per Session:    Stress:     Feeling of Stress :    Social Connections:     Frequency of Communication with Friends and Family:     Frequency of Social Gatherings with Friends and Family:     Attends Church Services:     Active Member of Clubs or Organizations:     Attends Club or Organization Meetings:     Marital Status:    Intimate Partner Violence:     Fear of Current or Ex-Partner:     Emotionally Abused:     Physically Abused:     Sexually Abused:        FHX:   No family history on file. Allergy:   No Known Allergies    Patient Active Problem List   Diagnosis Code    Psychosis ([facility] F29    Delusional disorder ([facility] [address] [facility] F22       Home Medications:     No medications prior to admission. Review of Systems:     Constitutional: No fever, chills, weight loss, fatigue.    Skin: No rashes, pruritis, jaundice, ulcerations, erythema. HENT: No headache, nosebleed, sinus pressure, rhinorrhea, sore throat. Eyes: No visual changes, blurred vision, eye pain, photophobia, jaundice. Cardiovascular: No chest pain, heart palpitation   Respiratory: No cough, shortness of breath, wheezing, chest discomfort   Gastrointestinal: As in HPI   Genitourinary: No dysuria, bleeding, discharge, pyuria. Musculoskeletal: No weakness, arthralgias, wasting. Endo: No sweats. Heme: No bruising, easy bleeding. Allergies: As noted. Neurological: Alert and oriented. Gait not assessed. Psychiatric:  No anxiety, depression, hallucinations. Visit Vitals  /77   Pulse 91   Temp 97.2 °F (36.2 °C)   Resp 18   Wt 99.8 kg (220 lb)   SpO2 99%       Physical Assessment:     constitutional: appearance: well developed, no deformities, in no acute distress. skin: inspection: no rashes, ulcers, icterus or other lesions; no clubbing or telangiectasias. eyes: inspection: normal conjunctivae and lids; no jaundice pupils: normal  ENMT: wearing mask  neck: thyroid: normal size, consistency and position; no masses or tenderness. respiratory: effort: normal chest excursion; no intercostal retraction or accessory muscle use. cardiovascular: normal rhythm/rate   abdominal: abdomen: normal consistency; no tenderness or masses. hernias: no hernias appreciated. liver: normal size and consistency. spleen: not palpable. rectal: hemoccult/guaiac: not performed. musculoskeletal: normal range of motion; no pain, deformity or contracture. neurologic: grossly intact by observation   psychiatric: judgement/insight: within normal limits. memory: within normal limits for recent and remote events. mood and affect: no evidence of depression, anxiety or agitation. orientation: oriented to time, space and person.         Basic Metabolic Profile   Recent Results (from the past 24 hour(s))   HEPATITIS PANEL, ACUTE Collection Time: 05/18/21  9:15 AM   Result Value Ref Range    Hepatitis A, IgM Negative NEG      __          Hepatitis B surface Ag <0.10 <1.00 Index    Hep B surface Ag Interp. Negative NEG      __          Hepatitis B core, IgM Negative NEG      __          Hepatitis C virus Ab 0.11 <0.80 Index    Hep C virus Ab Interp. Negative NEG      Hep C  virus Ab comment         HEPATIC FUNCTION PANEL    Collection Time: 05/18/21  9:15 AM   Result Value Ref Range    Protein, total 7.3 6.4 - 8.2 g/dL    Albumin 3.8 3.4 - 5.0 g/dL    Globulin 3.5 2.0 - 4.0 g/dL    A-G Ratio 1.1 0.8 - 1.7      Bilirubin, total 0.5 0.2 - 1.0 MG/DL    Bilirubin, direct 0.2 0.0 - 0.2 MG/DL    Alk. phosphatase 66 45 - 117 U/L    AST (SGOT) 40 (H) 10 - 38 U/L    ALT (SGPT) 104 (H) 16 - 61 U/L   LIPID PANEL    Collection Time: 05/19/21  6:15 AM   Result Value Ref Range    LIPID PROFILE          Cholesterol, total 110 <200 MG/DL    Triglyceride 58 <150 MG/DL    HDL Cholesterol 39 (L) 40 - 60 MG/DL    LDL, calculated 59.4 0 - 100 MG/DL    VLDL, calculated 11.6 MG/DL    CHOL/HDL Ratio 2.8 0 - 5.0     HEMOGLOBIN A1C W/O EAG    Collection Time: 05/19/21  6:15 AM   Result Value Ref Range    Hemoglobin A1c 5.6 4.2 - 5.6 %   CK    Collection Time: 05/19/21  6:15 AM   Result Value Ref Range     (H) 39 - 308 U/L   HEPATIC FUNCTION PANEL    Collection Time: 05/19/21  6:15 AM   Result Value Ref Range    Protein, total 7.3 6.4 - 8.2 g/dL    Albumin 3.7 3.4 - 5.0 g/dL    Globulin 3.6 2.0 - 4.0 g/dL    A-G Ratio 1.0 0.8 - 1.7      Bilirubin, total 0.6 0.2 - 1.0 MG/DL    Bilirubin, direct 0.2 0.0 - 0.2 MG/DL    Alk.  phosphatase 62 45 - 117 U/L    AST (SGOT) 31 10 - 38 U/L    ALT (SGPT) 91 (H) 16 - 61 U/L   PLATELET COUNT    Collection Time: 05/19/21  6:15 AM   Result Value Ref Range    PLATELET 971 877 - 210 K/uL   PROTHROMBIN TIME + INR    Collection Time: 05/19/21  6:15 AM   Result Value Ref Range    Prothrombin time 14.0 11.5 - 15.2 sec    INR 1.1 0.8 - 1.2           Results     Procedure Component Value Units Date/Time    COVID-19 RAPID TEST [[id number]] Collected: 05/14/21 2324    Order Status: Completed Specimen: Nasopharyngeal Updated: 05/15/21 0001     Specimen source Nasopharyngeal        COVID-19 rapid test Not detected        Comment: Rapid Abbott ID Now       Rapid NAAT:  The specimen is NEGATIVE for SARS-CoV-2, the novel coronavirus associated with COVID-19. Negative results should be treated as presumptive and, if inconsistent with clinical signs and symptoms or necessary for patient management, should be tested with an alternative molecular assay. Negative results do not preclude SARS-CoV-2 infection and should not be used as the sole basis for patient management decisions. This test has been authorized by the FDA under an Emergency Use Authorization (EUA) for use by authorized laboratories. Fact sheet for Healthcare Providers: ConventionUpdate.co.nz  Fact sheet for Patients: ConventionUpdate.co.nz       Methodology: Isothermal Nucleic Acid Amplification               Radiology   US Results (most recent): pending  No results found for this or any previous visit. DearCATARINA Castillo. [facility] [address]  Cell: [phone]  [email or web address]

## 2021-05-19 NOTE — GROUP NOTE
CHAS  GROUP DOCUMENTATION INDIVIDUAL Group Therapy Note Date: 5/18/2021 Group Start Time: 3:30pm 
Group End Time: 4:00pm 
Group Topic: Social Work Group [facility] 1 ADULT CHEM DEP Rico Blocker, [address] Group Therapy Note Attendees: 7 Attendance: Attended Interventions/techniques: Informed Follows Directions: Followed directions Interactions: Interacted appropriately Mental Status: Calm Behavior/appearance: Attentive Goals Achieved: Able to listen to others [place]

## 2021-05-19 NOTE — BSMART NOTE
AIRAM assessment/Intervention:  Patient expressed no major issues or concerns. Patient complied with group and was able to address the need for treatment. Patient is polite and reports he is thankful for the decision made to seek treatment. Patient reports this experience has been eye opening and he will make new decisions upon discharge. SW encouraged patient to continue processing feelings. SW will continue supportive counseling and will assist as needed.  
 
Srinivas Salazar, CASSANDRA-E

## 2021-05-19 NOTE — BSMART NOTE
OCCUPATIONAL THERAPY PROGRESS NOTE Group Time:  1430 Attendance: The patient attended full group. Participation: The patient participated fully in the activity. Attention: The patient was able to focus on the activity. Interaction: The patient occasionally  interacts with others. Able to focus on activity.

## 2021-05-19 NOTE — PROGRESS NOTES
conducted an Spirituality Group for Felipa Dugan, who is a 33 y.o.,male. Patient's Primary Language is: [place]. According to the patient's EMR Restorationism Affiliation is: Non Latter-day.     The reason the Patient came to the hospital is:   Patient Active Problem List    Diagnosis Date Noted    Delusional disorder ([facility] 05/17/2021    Psychosis ([facility] 05/15/2021         conducted Spirituality Group for The Steps You Take, we discussed moving forward from present to future. Patient was one of 9 participants. The  provided the following Interventions:  Continued the relationship of care and support. Listened empathically. Encouraged patients to contact the 's office. Chart reviewed. The following outcomes were achieved:  Patient expressed gratitude for 's visit. Patient fully alert and participated in group discussions. Plan:  Chaplains will continue to follow and will provide pastoral care on an as needed/requested basis.  recommends bedside caregivers page  on duty if patient shows signs of acute spiritual or emotional distress.        [address] [place] “” [place]   [phone]

## 2021-05-19 NOTE — PROGRESS NOTES
[address]  Inpatient Progress Note     Date of Service: 05/19/21  Hospital Day: 4     Subjective/Interval History   05/19/21    Treatment Team Notes:  Notes reviewed and/or discussed and report that Frederic West is a patient with a history of a delusional disorder, and post Covid infection, continuing to show evidence of improvement. Due to the presence of elevated liver profile a GI consult was requested with our consultants evaluation being greatly appreciated. Of interest is Destiny Alberts I confronted the patient with the possibility of his drinking patterns being one of the reason for which his liver profiles were elevated, he indicated that he was drinking only once a month if any. Regardless the hepatitis profile this is still pending however the patient's liver enzymes are showing downward trend. Again we appreciated that our consultants help      Patient interview: Frederic West was interviewed by this writer today. The patient is tolerating current prescription for olanzapine 2.5 mg twice a day very well. He is showing no evidence of medications related side effects, and there is obvious evidence of improvement regarding his delusional thought processes. He is currently denying any hallucinatory process, and even though denial and rationalization the same as projection being his main ego defenses, there is some evidence that his insight is also improving. Objective     Visit Vitals  /77   Pulse 91   Temp 97.2 °F (36.2 °C)   Resp 18   Wt 99.8 kg (220 lb)   SpO2 99%     Vitals are stable.     Recent Results (from the past 24 hour(s))   LIPID PANEL    Collection Time: 05/19/21  6:15 AM   Result Value Ref Range    LIPID PROFILE          Cholesterol, total 110 <200 MG/DL    Triglyceride 58 <150 MG/DL    HDL Cholesterol 39 (L) 40 - 60 MG/DL    LDL, calculated 59.4 0 - 100 MG/DL    VLDL, calculated 11.6 MG/DL    CHOL/HDL Ratio 2.8 0 - 5.0     HEMOGLOBIN A1C W/O EAG    Collection Time: 05/19/21  6:15 AM   Result Value Ref Range    Hemoglobin A1c 5.6 4.2 - 5.6 %   CK    Collection Time: 05/19/21  6:15 AM   Result Value Ref Range     (H) 39 - 308 U/L   HEPATIC FUNCTION PANEL    Collection Time: 05/19/21  6:15 AM   Result Value Ref Range    Protein, total 7.3 6.4 - 8.2 g/dL    Albumin 3.7 3.4 - 5.0 g/dL    Globulin 3.6 2.0 - 4.0 g/dL    A-G Ratio 1.0 0.8 - 1.7      Bilirubin, total 0.6 0.2 - 1.0 MG/DL    Bilirubin, direct 0.2 0.0 - 0.2 MG/DL    Alk. phosphatase 62 45 - 117 U/L    AST (SGOT) 31 10 - 38 U/L    ALT (SGPT) 91 (H) 16 - 61 U/L   PLATELET COUNT    Collection Time: 05/19/21  6:15 AM   Result Value Ref Range    PLATELET 911 802 - 748 K/uL   PROTHROMBIN TIME + INR    Collection Time: 05/19/21  6:15 AM   Result Value Ref Range    Prothrombin time 14.0 11.5 - 15.2 sec    INR 1.1 0.8 - 1.2       Above results noted. Mild but positive improvement regarding his liver dysfunction noted. CK has improved very appropriately. A1c normal at 5.6% also noted. Mental Status Examination     Appearance/Hygiene 33 y.o. BLACK/ male  Hygiene: Fair   Behavior/Social Relatedness Appropriate   Musculoskeletal Gait/Station: appropriate  Tone (flaccid, cogwheeling, spastic): not assessed  Psychomotor (hyperkinetic, hypokinetic): calm   Involuntary movements (tics, dyskinesias, akathisa, stereotypies): none   Speech   Rate, rhythm, volume, fluency and articulation are appropriate   Mood   appropriate to situation   Affect    less labile less irritable   Thought Process Linear and goal directed   Thought Content and Perceptual Disturbances Denies self-injurious behavior (SIB), suicidal ideation (SI), aggressive behavior or homicidal ideation (HI)    Denies auditory and visual hallucinations, with the patient delusional thoughts also showing positive improvement.    Sensorium and Cognition  Grossly intact   Insight  slowly improving   Judgment  improving        Assessment/Plan Psychiatric Diagnoses:   Patient Active Problem List   Diagnosis Code    Psychosis ([facility] [address] [facility] F29    Delusional disorder ([facility] [address] [facility] F22       Medical Diagnoses: Plus elevated liver enzymes    Psychosocial and contextual factors: Same    Level of impairment/disability: Moderately severe    1. We will continue treatment with olanzapine 2.5 mg twice a day. The patient has not required since olanzapine was a started the prescription of haloperidol prn only once. We will continue to observe closely and if needed with olanzapine will be increased again. We are still having problems determine inquiry the patient has become psychotic. Of interest is that his wife also showed the same psychotic symptoms that the patient has, in addition to her also having a history of being Covid positive. So the only common ground to both of them regarding the psychotic symptoms are the Covid infection, the same as a history of smoking cannabis. They never had reacted like this to smoking the drug, however. So we are attending to believe that it was that Covid infection the trigger. 2.  Reviewed instructions, risks, benefits and side effects of medications  3.   Disposition/Discharge Date: self-care/home, TBD    Gretel Rosenberg MD, [address] [place] [address]  Psychiatry

## 2021-05-19 NOTE — BH NOTES
Pt requested and received haldol 5 mg. Po for \"my thoughts\". He is now going to return to his room to try and sleep.

## 2021-05-19 NOTE — PROGRESS NOTES
Patient has remain NPO for test this morning. Patient left via w/c to US accompanied by transport and MHT.

## 2021-05-19 NOTE — BH NOTES
MHT Note   Patient was calm and compliant throughout the shift. Patient spent most of the shift in the day area interacting with patients and staff. Patient consumed all of his meals during the shift. Patient was able to contract for safety during the shift. No falls or major issues to report at this time.

## 2021-05-19 NOTE — GROUP NOTE
CHAS  GROUP DOCUMENTATION INDIVIDUAL Group Therapy Note Date: 5/19/2021 Group Start Time: 7617 Group End Time: 0230 Group Topic: Social Work Group [address] 1 ADULT CHEM [place], [address]  Group Therapy Note Attendees: 3 Attendance: Attended Interventions/techniques: Informed Follows Directions: Followed directions Interactions: Interacted appropriately Mental Status: Calm Behavior/appearance: Attentive Goals Achieved: Able to listen to others Franny Cornell

## 2021-05-20 PROCEDURE — 74011250637 HC RX REV CODE- 250/637: Performed by: PSYCHIATRY & NEUROLOGY

## 2021-05-20 PROCEDURE — 65220000003 HC RM SEMIPRIVATE PSYCH

## 2021-05-20 PROCEDURE — 74011250637 HC RX REV CODE- 250/637: Performed by: EMERGENCY MEDICINE

## 2021-05-20 PROCEDURE — 99232 SBSQ HOSP IP/OBS MODERATE 35: CPT | Performed by: PSYCHIATRY & NEUROLOGY

## 2021-05-20 RX ADMIN — AMOXICILLIN 500 MG: 250 CAPSULE ORAL at 17:08

## 2021-05-20 RX ADMIN — CIPROFLOXACIN HYDROCHLORIDE AND HYDROCORTISONE 3 DROP: 2; 10 SUSPENSION AURICULAR (OTIC) at 17:08

## 2021-05-20 RX ADMIN — AMOXICILLIN 500 MG: 250 CAPSULE ORAL at 08:10

## 2021-05-20 RX ADMIN — TRAZODONE HYDROCHLORIDE 50 MG: 50 TABLET ORAL at 20:52

## 2021-05-20 RX ADMIN — OLANZAPINE 2.5 MG: 2.5 TABLET, FILM COATED ORAL at 08:10

## 2021-05-20 RX ADMIN — OLANZAPINE 2.5 MG: 2.5 TABLET, FILM COATED ORAL at 20:52

## 2021-05-20 RX ADMIN — CIPROFLOXACIN HYDROCHLORIDE AND HYDROCORTISONE 3 DROP: 2; 10 SUSPENSION AURICULAR (OTIC) at 08:10

## 2021-05-20 RX ADMIN — AMOXICILLIN 500 MG: 250 CAPSULE ORAL at 00:14

## 2021-05-20 NOTE — PROGRESS NOTES
Problem: Falls - Risk of  Goal: *Absence of Falls  Description: Patient to be absence of falls every day while hospitalized. Outcome: Progressing Towards Goal  Note: Fall Risk Interventions:            Medication Interventions: Teach patient to arise slowly         History of Falls Interventions: Investigate reason for fall         Problem: Psychosis  Goal: *STG: Remains safe in hospital  Description: Patient to remain safe every day while hospitalized. Outcome: Progressing Towards Goal  Goal: *STG: Participates in individual and group therapy  Description: Patient to participate in 2-3 group therapy every day while hospitalized. Outcome: Progressing Towards Goal   Patient states he is not hearing voices. He is taking his medications. Denies SI. His conversation is logical. Attending groups. Implemented All Fall Risk Interventions:  [place] to call system. Call bell, personal items and telephone within reach. Instruct patient to call for assistance. Room bathroom lighting operational. Non-slip footwear when patient is off stretcher. Physically safe environment: no spills, clutter or unnecessary equipment. Stretcher in lowest position, wheels locked, appropriate side rails in place. Provide visual cue, wrist band, yellow gown, etc. Monitor gait and stability. Monitor for mental status changes and reorient to person, place, and time. Review medications for side effects contributing to fall risk. Reinforce activity limits and safety measures with patient and family.

## 2021-05-20 NOTE — BH NOTES
Pt c/o feeling \"uneasy and not right\" and expressed that the his roommate in 106[address] is causing him these feelings. Pt was offered to be moved to another room but he declined, pt did accept to move to  Bed 106-4, a bed that is further from 106-1. Pt given prn haldol 5 mg po. Pt was commended for bringing his concerns to the attention of staff.  Will continue to monitor

## 2021-05-20 NOTE — BH NOTES
At 2138, patient had came up to speak with the writer and the charge nurse on the unit (STU1) and stated \" I'm trying to hold on by being humble, but I feel very uncomfortable about this man (other patient) getting up in the night mumbling, pacing and acting weird while I'm trying to sleep, I just feel uncomfortable about the situation and I don't want to go off. \" Will continue to monitor the patient's safety.

## 2021-05-20 NOTE — BSMART NOTE
ART THERAPY GROUP PROGRESS NOTE PATIENT SCHEDULED FOR GROUP AT: 12:30 
 
ATTENDANCE: Full PARTICIPATION LEVEL: Participates fully in the art process. ATTENTION LEVEL : Able to focus on task. FOCUS: Identifying Obstacles and Goals SYMBOLIC & THEMATIC CONTENT AS NOTED IN IMAGERY: He was calm and presented with a bright affect. He was invested in the task at hand and participated in group discussion. His obstacles identified were: \"self neglect\", \"bad habits\", and \"fear. \" Goals were \"freedom\", \"positivity\", and \"order. \"

## 2021-05-20 NOTE — GROUP NOTE
CHAS  GROUP DOCUMENTATION INDIVIDUAL Group Therapy Note Date: 5/20/2021 Group Start Time: 1100 Group End Time: 0786 Group Topic: Nursing [facility] 1 SPECIAL TRTMT 1 [address] [place]  GROUP DOCUMENTATION GROUP Group Therapy Note Attendees: 5 Attendance: Attended Patient's Goal:  Worksheet and Discussion on Sleep Hygiene Interventions/techniques: Informed Follows Directions: Followed directions Interactions: Interacted appropriately Mental Status: Calm Behavior/appearance: Cooperative Goals Achieved: Able to engage in interactions and Able to listen to others Additional Notes:   
 
Aydin Lui RN Discussion on how taking sleeping medications is not always the answer to getting a good night sleep. Tips on have to promote relaxation to help with falling asleep naturally. Discussed coping skills, how to set a routine throughout day. Foods to avoid before bedtime.

## 2021-05-20 NOTE — BSMART NOTE
SOCIAL WORK GROUP THERAPY PROGRESS NOTE Group Time:  10:15am    
 
Group Topic:  Coping Skills    C D Issues Group Participation:   
 
Pt moderately involved during group discussion but remained attentive. Took initiative to respond to items on handouts. Not as dysphoric. Looked at the \"Process of setting Goals\", the value of a \"daily\" /  \"weekly\" schedule as tool to build self esteem, sharpen decision making skills and help define one's reality. Did handout on  x25 ways to be better in managing \"anxiety and depression\". Admits needs to have better Daily Plan to use time more efficiently.

## 2021-05-20 NOTE — PROGRESS NOTES
Comprehensive Nutrition Assessment    Type and Reason for Visit: Initial, RD nutrition re-screen/LOS    Nutrition Recommendations/Plan:   - Continue regular diet. Nutrition Assessment:  Admitted for psychosis. NPO yesterday for RUQ US by GI, diet resumed. Reports good appetite and po intake since admission. Malnutrition Assessment:  Malnutrition Status:  No malnutrition      Nutrition History and Allergies: Pt denies changes in appetite or weight hx PTA with UBW around 220#. States he does tend to eat unhealthier foods.  NKFA    Estimated Daily Nutrient Needs:  Energy (kcal): 0112-7616; Weight Used for Energy Requirements: Current (100 kg)  Protein (g): ; Weight Used for Protein Requirements: Current (0.8-1)  Fluid (ml/day): 1144-8406; Method Used for Fluid Requirements: 1 ml/kcal    Nutrition Related Findings:  None      Wounds:    None       Current Nutrition Therapies:  DIET REGULAR    Anthropometric Measures:  · Height:  5' 10\" (177.8 cm) (pt stated)  · Current Body Wt:  99.8 kg (220 lb)   · Admission Body Wt:  220 lb    · Usual Body Wt:  99.8 kg (220 lb)     · Ideal Body Wt:  166 lbs:  132.5 %    · BMI Category:  Obese class 1 (BMI 30.0-34. 9)       Nutrition Diagnosis:   No nutrition diagnosis at this time    Nutrition Interventions:   Food and/or Nutrient Delivery: Continue current diet  Nutrition Education and Counseling: Education not indicated  Coordination of Nutrition Care: Continue to monitor while inpatient    Goals:  PO nutrition intake will continue to meet >75% of patients estimated nutritional needs over the next 7 days.        Nutrition Monitoring and Evaluation:   Behavioral-Environmental Outcomes: None identified  Food/Nutrient Intake Outcomes: Food and nutrient intake  Physical Signs/Symptoms Outcomes: Meal time behavior, Nutrition focused physical findings    Discharge Planning:    No discharge needs at this time     Electronically signed by Shirlene Winkler RD on 5/20/2021 at 10:40 AM    Contact: [phone]

## 2021-05-20 NOTE — PROGRESS NOTES
[address]  Inpatient Progress Note     Date of Service: 05/20/21  Hospital Day: 5     Subjective/Interval History   05/20/21    Treatment Team Notes:  Notes reviewed and/or discussed and report that Blaine Calix is a patient with a history of a delusional disorder post Covid infection, showing positive treatment response to current prescription for olanzapine. Patient interview: Blaine Calix was interviewed by this writer today. During our session the patient described a positive treatment response to current prescription for olanzapine, denying in addition any adverse effects associated with to the treatment with the atypical antipsychotic. During session, he also describes the fact that the auditory hallucinations that he was hearing when he was initially admitted, have completely subsided. His paranoia is also subsiding very appropriately to the point in which we are hopeful that we will be able to discharge him tomorrow. He is agreeable to outpatient care which will be continue with the counselor that he and his wife had seeing as an outpatient. So if current improvement continues discharged tomorrow will follow. Objective     Visit Vitals  /77 (BP 1 Location: Left upper arm, BP Patient Position: Sitting)   Pulse 87   Temp 97.3 °F (36.3 °C)   Resp 16   Ht 5' 10\" (1.778 m)   Wt 99.8 kg (220 lb)   SpO2 99%   BMI 31.57 kg/m²       No results found for this or any previous visit (from the past 24 hour(s)). Mental Status Examination     Appearance/Hygiene 33 y.o.  BLACK/ male  Hygiene: Fair   Behavior/Social Relatedness Appropriate   Musculoskeletal Gait/Station: appropriate  Tone (flaccid, cogwheeling, spastic): not assessed  Psychomotor (hyperkinetic, hypokinetic): calm   Involuntary movements (tics, dyskinesias, akathisa, stereotypies): none   Speech   Rate, rhythm, volume, fluency and articulation are appropriate   Mood   denies depression   Affect irritability and liability had this subsided   Thought Process Linear and goal directed   Thought Content and Perceptual Disturbances Denies self-injurious behavior (SIB), suicidal ideation (SI), aggressive behavior or homicidal ideation (HI)    Denies auditory and visual hallucinations, denies any delusional thoughts, ideas of reference or influence. Sensorium and Cognition  Grossly intact   Insight  improving   Judgment  improving        Assessment/Plan      Psychiatric Diagnoses:   Patient Active Problem List   Diagnosis Code    Psychosis ([facility] [address] F29    Delusional disorder ([facility] [address] F22       Medical Diagnoses: Same    Psychosocial and contextual factors: Same    Level of impairment/disability: Improved to moderate     1. The patient continues to tolerate current treatment with olanzapine very well. During our session today he was very clear as to what he expects when he goes back home. He is also agree with his wife that their families are intrusive, however his response to the intervention appears to be more appropriate than fair response to same. 2.  Reviewed instructions, risks, benefits and side effects of medications  3. Disposition/Discharge Date: self-care/home, tomorrow if stable.     Osmany Gill MD, [address] [place] [address]  Psychiatry

## 2021-05-20 NOTE — GROUP NOTE
CHAS  GROUP DOCUMENTATION INDIVIDUAL Group Therapy Note Date: 5/19/2021 Group Start Time: 2000 Group End Time: 2030 Group Topic: Medication [facility] 1 SPECIAL TRTMT 1 [facility] GROUP DOCUMENTATION GROUP Group Therapy Note Attendees: 7 Attendance: Attended Interventions/techniques: Informed Follows Directions: Followed directions Interactions: Interacted appropriately Mental Status: Calm Behavior/appearance: Attentive Goals Achieved: Able to listen to others Yvan Mreino

## 2021-05-20 NOTE — BSMART NOTE
OCCUPATIONAL THERAPY PROGRESS NOTE Group Time:  2639 Attendance: The patient attended full group. Participation: The patient participated fully in the activity. Attention: The patient was able to focus on the activity. Interaction: The patient frequently interacts with others. Focused and appropriate. Able to ID some positive thinking and affirmations to use.

## 2021-05-20 NOTE — BSMART NOTE
SW Contact: This x31 yr  male was admitted through our EDstated he was initially treated at the [facility] because he \"needed to get this stuff right, the messages I am getting are not what I'm supposed to do\", [facility] stated after 1 day his wife signed him out AMA, he denies SI/HI, he denies visual hallucinations although appears to be responding to internal stimuli, he stated he was \"not sure if I'm hearing things, this is kind of complicated, because I do then\" 
  
He described his use of cannabis as long lasting about x5 yrs.  Did not want to be very specific; however, he did mention that his cannabis use had gotten to the point in which he was smoking 2-3 times a day.   
  
CLINICAL IMPRESSION: 
AXIS I:  Drug-induced psychotic disorder.  Cannabis use disorder, severe. AXIS II:  Noncontributory. AXIS III:  Elevated liver enzymes of undetermined etiology with normal ammonia levels, CK elevation, possibly related to dehydration, improving. Bilateral ear infection, under tx. Intervention: reviewed basic CBT principles with pt. He is starting to gain insight into the correlation of relationship between his negative self statements and moods. Looked at value of using journal to sort out his dysfunctional / irrational ideas & practice substituting more positive ones. Dr & tx team updated.

## 2021-05-21 VITALS
SYSTOLIC BLOOD PRESSURE: 144 MMHG | OXYGEN SATURATION: 99 % | HEART RATE: 90 BPM | HEIGHT: 70 IN | TEMPERATURE: 98.2 F | RESPIRATION RATE: 18 BRPM | BODY MASS INDEX: 31.5 KG/M2 | DIASTOLIC BLOOD PRESSURE: 65 MMHG | WEIGHT: 220 LBS

## 2021-05-21 PROCEDURE — 74011250637 HC RX REV CODE- 250/637: Performed by: EMERGENCY MEDICINE

## 2021-05-21 PROCEDURE — 74011250637 HC RX REV CODE- 250/637: Performed by: PSYCHIATRY & NEUROLOGY

## 2021-05-21 PROCEDURE — 99238 HOSP IP/OBS DSCHRG MGMT 30/<: CPT | Performed by: PSYCHIATRY & NEUROLOGY

## 2021-05-21 RX ORDER — AMOXICILLIN 500 MG/1
500 CAPSULE ORAL EVERY 8 HOURS
Qty: 21 CAPSULE | Refills: 0 | Status: SHIPPED | OUTPATIENT
Start: 2021-05-16 | End: 2021-05-23

## 2021-05-21 RX ORDER — OLANZAPINE 2.5 MG/1
2.5 TABLET ORAL 2 TIMES DAILY
Qty: 60 TABLET | Refills: 0 | Status: SHIPPED | OUTPATIENT
Start: 2021-05-21

## 2021-05-21 RX ORDER — TRAZODONE HYDROCHLORIDE 50 MG/1
50 TABLET ORAL
Qty: 30 TABLET | Refills: 0 | Status: SHIPPED | OUTPATIENT
Start: 2021-05-21

## 2021-05-21 RX ADMIN — AMOXICILLIN 500 MG: 250 CAPSULE ORAL at 08:05

## 2021-05-21 RX ADMIN — CIPROFLOXACIN HYDROCHLORIDE AND HYDROCORTISONE 3 DROP: 2; 10 SUSPENSION AURICULAR (OTIC) at 09:00

## 2021-05-21 RX ADMIN — AMOXICILLIN 500 MG: 250 CAPSULE ORAL at 00:00

## 2021-05-21 RX ADMIN — OLANZAPINE 2.5 MG: 2.5 TABLET, FILM COATED ORAL at 08:05

## 2021-05-21 NOTE — PROGRESS NOTES
Patient received discharge instructions, verbalized understanding of follow up appt. All personal items given to pt.

## 2021-05-21 NOTE — GROUP NOTE
[facility] GROUP DOCUMENTATION INDIVIDUAL Group Therapy Note Date: 5/20/2021 Group Start Time: 2000 Group End Time: 2015 Group Topic: Medication [facility] 1 SPECIAL TRTMT 1 Pastor Yehuda RN 
 
[facility] GROUP DOCUMENTATION GROUP Group Therapy Note Attendees: 7 Attendance: Attended Patient's Goal:  Understanding medication being provided. Interventions/techniques: Informed Follows Directions: Followed directions Interactions: Interacted appropriately Mental Status: Calm Behavior/appearance: Cooperative Goals Achieved: Able to receive feedback Additional Notes:  Pt has been isolated to self. Pt reported that he is doing \"a whole lot better. I have not been refusing any help. \"  Pt was educated on medication being provided. Pt reported that he was having a difficulty with getting to sleep. PRN medication education provided. Pt was offered medication for insomnia and agreed to try it. Will continue to monitor and support as needed.   
 
Corina Nava RN

## 2021-05-21 NOTE — DISCHARGE INSTRUCTIONS
BEHAVIORAL HEALTH NURSING DISCHARGE NOTE      The following personal items collected during your admission are returned to you:   Dental Appliance:    Vision: Visual Aid: None  Hearing Aid:    Jewelry:    Clothing: Clothing: Shirt, Socks  Other Valuables:    Valuables sent to safe:        PATIENT INSTRUCTIONS:         The discharge information has been reviewed with the patient. The patient verbalized understanding.         Patient armband removed and shredded

## 2021-05-21 NOTE — GROUP NOTE
CHAS  GROUP DOCUMENTATION INDIVIDUAL Group Therapy Note Date: 5/21/2021 Group Start Time: 0830 Group End Time: 9309 Group Topic: Nursing [facility] 1 SPECIAL TRTMT 1 Tonya Perry RN 
 
[facility] GROUP DOCUMENTATION GROUP Group Therapy Note Attendees: 4 Attendance: Attended Patient's Goal:  Stress Management Interventions/techniques: Informed Follows Directions: Followed directions Interactions: Interacted appropriately Mental Status: Calm Behavior/appearance: Cooperative Goals Achieved: Able to engage in interactions and Able to listen to others Additional Notes:  Discussion on stressors in life, how to cope, things we can control on things we can't control.   
 
Samy Brooks RN

## 2021-05-21 NOTE — BSMART NOTE
SW Contact: This x31 yr  male was admitted through our EDstated he was initially treated at the [facility] because he \"needed to get this stuff right, the messages I am getting are not what I'm supposed to do\", [facility] stated after 1 day his wife signed him out AMA, he denies SI/HI, he denies visual hallucinations although appears to be responding to internal stimuli, he stated he was \"not sure if I'm hearing things, this is kind of complicated, because I do then\" 
  
Pt affect much brighter with him verbalizing commitment towards positive change. Goals are starting to be formed to help challenge his negative self statements. 
  
CLINICAL IMPRESSION: 
AXIS I:  Drug-induced psychotic disorder.  Cannabis use disorder, severe. AXIS II:  Noncontributory. AXIS III:  Elevated liver enzymes of undetermined etiology with normal ammonia levels, CK elevation, possibly related to dehydration, improving. Bilateral ear infection, under tx. Intervention:  Did more review of CBT concepts including frequently used negative self statements, that need to be monitored. Major focus on D/C plan was discussion of returning to [address] [place] [address] [facility] to continue outpt tx for therapy & med management. PT HAS REQUESTED TO MAKE HIS OWN APPOINTMENTS.  (SEE FYI FOR DETAILS). Dr & Tx team updated.

## 2021-05-22 NOTE — DISCHARGE SUMMARY
[facility]    Name:  Olman Luu  MR#:   [id number]  :  1990  ACCOUNT #:  [de-identified]  ADMIT DATE:  2021  DISCHARGE DATE:  2021      Admission date to the emergency room 2021, admission to the behavioral center was 05/15/2021. SIGNIFICANT FINDINGS:  History and physical exam was performed shortly after the patient was admitted to the facility, attention is invited to this document, a place where the reasons for which the patient was brought to the attention of DR. DELONG'S [facility] [place] [facility]"" Emergency Room, the findings upon his being examined there are the same as the reasons then for which he required an inpatient hospitalization are very clearly stated. For the purpose of this discharge summary, the reader is advised that indeed the patient had been brought to the attention of [facility] ER via ambulance after he has been evaluated at one of the [facility] Emergency Rooms in this area who had apparently been found rather agitated and psychotic. He was suggested to come to our emergency room for further evaluation. The patient initially did not provide any history other than what was mentioned to us when he was examined by one of our physician assistants in the emergency room; however, eventually he did describe having a sensation of \"increased pressure\" in his chest which he described being the reason for which while in the process of being admitted he had become increasingly agitated, speaking of spirits, and having described also as trying to jump out of the ambulance that had brought him in. The patient initially had denied any suicidal thoughts or homicidal thoughts, and also denied the use of drugs or alcohol; however, upon admission to the ER, the urine drug screen came out positive for cannabis.   It is of interest to note that in addition to the patient himself having some psychotic symptoms, questions have been raised about his wife also having symptoms herself. She had mentioned when she was interviewed trying to obtain further history about her 's evaluation in the ER that the patient had been admitted to the [facility] in [place] that he had signed out upon her insistence, she says, since she believed that the patient care at the [place] was the reason for which he had become so agitated, and again with his reality testing becoming impaired. Regardless, at the time of the patient's evaluation at the ER, he was considered for the possibility of a facility under the auspices of a temporary detaining order; however, when he was evaluated by the [facility], he was considered to be not detainable. Nonetheless, the patient did agree then at that time to sign in himself the reason for which he was admitted voluntarily. Multiple labs have been performed at the time of the patient's evaluation in the emergency department including a CBC with differential that showed normal test results. The urinalysis was found to be negative. Blood chemistry showed normal electrolytes with a slightly decreased potassium to 3.3. Normal kidney functioning tests. Normal liver function tests with exception though of an elevated ALT to 125 and AST to 170. Alkaline phosphatase normal at 61. CK was noted to be elevated to 5500 units/L with a normal troponin. Ammonia level is 23. Acetaminophen and salicylate levels were below range. Alcohol level is 5 mg/dL. TSH normal at 1.1. Urine drug screen positive only for cannabis. SARS-CoV-19 showed negative results from a nasopharyngeal study. Repeat of the patient's basic metabolic panel x2 on 14/02/9498, showed normal electrolytes with normal kidney functioning tests with CK trending downward and on 05/15/2021, at 4:30 p.m., results were as low as 3549 units/L.     An electrocardiogram also performed in the emergency room showed normal results with a normal sinus rhythm, ventricular rate response of 77 beats per minute, , QTc 443. CT scan of the head and cervical spine showed no evidence of pathology on either one of them. COURSE DURING HOSPITALIZATION AND TREATMENT:  Admitted to the special treatment unit one, the patient has been provided care on a daily basis and has been referred to the groups with context of the program.  Initially, our thoughts were that something had happened with the cannabis that the patient was using, perhaps being laced with something, was the reason for the pt becoming psychotic. However, the patient had described using the same cannabis that he had been using for an extended period of time, not recently purchased, having it at home and so the possibilities of something being added to the cannabis was very minimal, if any. Nonetheless, he was rather psychotic upon admission describing the presence of auditory hallucinations, tactile hallucinations in the form of pressure on his chest, with his explaining these symptoms being his difficulties with \"responding to his family's needs all the time. \"  There appeared to be some distress associated to the intrusiveness of the patient's family, both his side of the family the same as his wife's; however, they were apparently very concerned about him and her and had not heard from them for several days. It appears that the families had discussed about the possibility of writing a missing person report. As we progressed in treatment, the patient did begin to reassure us that the prescriptions for olanzapine 2.5 mg twice a day were very helpful to him and at the same time became much more open to what had been happening.   He did agree to the fact that his family was intrusive; however, it appears that one of the reasons for which the patient became psychotic (the same as his wife who has very similar symptoms that the patient had) could have been related to the fact that he was COVID positive and had an infection with the virus sometime in the month of 04/2021. Not clear as to where he consulted since he did not require any type of medical admissions; however, him and his wife had apparently had the symptoms and it is understanding that they had tested positive for COVID then. The patient's COVID results were negative, however, during this admission. So, question is the patient developing delusional thoughts associated to the COVID infection. Never had the patient have by the way any type of symptoms that had required his needing psychiatric care. He has been described by his wife the same as he has been observed to be very conscientious, able to participate in the treatment very appropriately and even though his boundaries were initially rather impaired by his delusional thoughts as soon as treatment began with olanzapine and upon prescriptions for Haldol as a p.r.n., the patient's psychosis began to improve to the point in which when seen during the last couple of days, there has been no evidence of psychotic symptoms, there has been no evidence of danger to self and/or others, and the patient is considered to be ready for discharge with outpatient treatment referrals. Excellent treatment tolerance also noted to current prescription for olanzapine, the same as when Haldol required to be utilized as a p.r.n. While in the facility, the patient had a physical exam done by Ernestina Cardozo, nurse practitioner, who confirmed the findings from the patient's physical condition in the emergency room. Blood pressure then was elevated to 141/94; however upon discharge, the patient's blood pressure is 116/75 with a heart rate of 86, afebrile at 97.3, and so considered to be very stable. The patient did have by the way, when examined in the emergency room, the presence of otitis of his left ear and did require treatment with antibiotic therapy and this also confirmed by Ms. eBrisk Videonorman.     No further tests were performed while the patient was treated with the exception of repeating his liver function tests due to their being found to be initially elevated with results by 05/19/2021, showing an ALT down to 91 from a previous result of 116. AST normal save one from a previous result of 62 international units/L. The patient's ammonia was considered to be normal at 25. Since he is being prescribed with olanzapine, a lipid panel was ordered showing triglycerides of 58, total cholesterol 110, HDL cholesterol 39, cholesterol-HDL ratio of 2.8, and LDL of 59.4. A1c 5.6%. TSH again normal at 1.1. Due to the patient's elevation of labs, a GI consult followed, attention invited to the GI consultation report which is self explanatory. Several studies then were requested including an INR that showed normal at 1.1. SHUKRI by IFA IgG showing negative results. Hepatitis B antinuclear tests negative. Hepatitis B core AB negative, hepatitis A negative, hepatitis B surface antigen negative, hepatitis B core IgM negative, hepatitis C negative. Antimitochondrial M2 AB showed negative results. Smooth muscle antibodies result is still pending. An ultrasound of the abdomen showed diffusely increased hepatic echogenicity most commonly seen with hepatic steatosis but could also be seen with hepatocellular disease such as cirrhosis, nonvisualization of the pancreas due to overlying bowel gas. The patient has been suggested to continue further outpatient followup with the primary care physician of his choice due to his medical needs and obviously to be followed psychiatrically for the care of his psychotic symptoms. FINAL DIAGNOSES:  AXIS I:  Delusional disorder. Rule out delusional disorder secondary to COVID infection. Cannabis use disorder, severe. AXIS II:  Noncontributory. AXIS III:  Hepatic steatosis improving. Rhabdomyolysis related to his dehydration improving. Left ear infection under treatment.     DISPOSITION:  The patient is discharged with outpatient followup suggestion with the providers he was seeing psychiatrically prior to admission the same as to continue further care with a primary care physician of his choice regarding his hepatic steatosis. He has been strongly suggested not to use drugs including alcohol and illicit drugs. PRESCRIPTIONS UPON DISCHARGE:  Thirty days' prescriptions will be written for the following medications including prescriptions for olanzapine 2.5 mg twice a day and amoxicillin 500 mg one every 8 hours to finish prescription of total of 7 days, Cipro HC otic suspension 3 drops left ear twice a day to finish 7 days prescription, trazodone 50 mg, #30, one every night as needed for insomnia. No evidence of medications-related side effects noted upon discharge. PROGNOSIS:  Fairly good with treatment compliance and drug-related abstinence.       Julee Barajas MD, LFAPA      [id number]  D:  05/21/2021 10:41  T:  05/22/2021 10:56  JOB #:  [id number]

## 2021-05-25 LAB — ACTIN IGG SERPL-ACNC: 8 UNITS (ref 0–19)
